# Patient Record
Sex: FEMALE | Race: WHITE | HISPANIC OR LATINO | Employment: OTHER | URBAN - METROPOLITAN AREA
[De-identification: names, ages, dates, MRNs, and addresses within clinical notes are randomized per-mention and may not be internally consistent; named-entity substitution may affect disease eponyms.]

---

## 2017-04-11 ENCOUNTER — ANESTHESIA EVENT (OUTPATIENT)
Dept: SURGERY | Facility: OTHER | Age: 52
DRG: 470 | End: 2017-04-11
Payer: MEDICARE

## 2017-04-11 ENCOUNTER — HOSPITAL ENCOUNTER (OUTPATIENT)
Dept: PREADMISSION TESTING | Facility: OTHER | Age: 52
Discharge: HOME | End: 2017-04-11
Attending: ORTHOPAEDIC SURGERY
Payer: MEDICARE

## 2017-04-11 VITALS
HEART RATE: 87 BPM | TEMPERATURE: 98 F | SYSTOLIC BLOOD PRESSURE: 142 MMHG | WEIGHT: 177 LBS | OXYGEN SATURATION: 97 % | BODY MASS INDEX: 29.49 KG/M2 | DIASTOLIC BLOOD PRESSURE: 93 MMHG | HEIGHT: 65 IN

## 2017-04-11 DIAGNOSIS — M17.12 PRIMARY OSTEOARTHRITIS OF LEFT KNEE: Primary | ICD-10-CM

## 2017-04-11 DIAGNOSIS — Z01.818 PREOP TESTING: ICD-10-CM

## 2017-04-11 LAB
ABO + RH BLD: NORMAL
ANION GAP SERPL CALC-SCNC: 9 MMOL/L
BASOPHILS # BLD AUTO: 0.02 K/UL
BASOPHILS NFR BLD: 0.3 %
BILIRUB UR QL STRIP: NEGATIVE
BLD GP AB SCN CELLS X3 SERPL QL: NORMAL
BUN SERPL-MCNC: 18 MG/DL
CALCIUM SERPL-MCNC: 10 MG/DL
CHLORIDE SERPL-SCNC: 105 MMOL/L
CLARITY UR: CLEAR
CO2 SERPL-SCNC: 29 MMOL/L
COLOR UR: YELLOW
CREAT SERPL-MCNC: 0.9 MG/DL
DIFFERENTIAL METHOD: ABNORMAL
EOSINOPHIL # BLD AUTO: 0.2 K/UL
EOSINOPHIL NFR BLD: 2.3 %
ERYTHROCYTE [DISTWIDTH] IN BLOOD BY AUTOMATED COUNT: 13.2 %
EST. GFR  (AFRICAN AMERICAN): >60 ML/MIN/1.73 M^2
EST. GFR  (NON AFRICAN AMERICAN): >60 ML/MIN/1.73 M^2
GLUCOSE SERPL-MCNC: 102 MG/DL
GLUCOSE UR QL STRIP: NEGATIVE
HCT VFR BLD AUTO: 44 %
HGB BLD-MCNC: 13.9 G/DL
HGB UR QL STRIP: NEGATIVE
KETONES UR QL STRIP: NEGATIVE
LEUKOCYTE ESTERASE UR QL STRIP: NEGATIVE
LYMPHOCYTES # BLD AUTO: 2.3 K/UL
LYMPHOCYTES NFR BLD: 34.7 %
MCH RBC QN AUTO: 27.6 PG
MCHC RBC AUTO-ENTMCNC: 31.6 %
MCV RBC AUTO: 87 FL
MONOCYTES # BLD AUTO: 0.5 K/UL
MONOCYTES NFR BLD: 7.1 %
NEUTROPHILS # BLD AUTO: 3.7 K/UL
NEUTROPHILS NFR BLD: 55.6 %
NITRITE UR QL STRIP: NEGATIVE
PH UR STRIP: 6 [PH] (ref 5–8)
PLATELET # BLD AUTO: 309 K/UL
PMV BLD AUTO: 9.9 FL
POTASSIUM SERPL-SCNC: 3.9 MMOL/L
PROT UR QL STRIP: NEGATIVE
RBC # BLD AUTO: 5.04 M/UL
SODIUM SERPL-SCNC: 143 MMOL/L
SP GR UR STRIP: <=1.005 (ref 1–1.03)
URN SPEC COLLECT METH UR: ABNORMAL
UROBILINOGEN UR STRIP-ACNC: NEGATIVE EU/DL
WBC # BLD AUTO: 6.63 K/UL

## 2017-04-11 PROCEDURE — 86900 BLOOD TYPING SEROLOGIC ABO: CPT

## 2017-04-11 PROCEDURE — 81003 URINALYSIS AUTO W/O SCOPE: CPT

## 2017-04-11 PROCEDURE — 85025 COMPLETE CBC W/AUTO DIFF WBC: CPT

## 2017-04-11 PROCEDURE — 87086 URINE CULTURE/COLONY COUNT: CPT

## 2017-04-11 PROCEDURE — 80048 BASIC METABOLIC PNL TOTAL CA: CPT

## 2017-04-11 PROCEDURE — 93005 ELECTROCARDIOGRAM TRACING: CPT

## 2017-04-11 PROCEDURE — 93010 ELECTROCARDIOGRAM REPORT: CPT | Mod: ,,, | Performed by: INTERNAL MEDICINE

## 2017-04-11 PROCEDURE — 86901 BLOOD TYPING SEROLOGIC RH(D): CPT

## 2017-04-11 PROCEDURE — 36415 COLL VENOUS BLD VENIPUNCTURE: CPT

## 2017-04-11 RX ORDER — FAMOTIDINE 20 MG/1
20 TABLET, FILM COATED ORAL
Status: CANCELLED | OUTPATIENT
Start: 2017-04-11 | End: 2017-04-11

## 2017-04-11 RX ORDER — FLUTICASONE PROPIONATE 50 MCG
1 SPRAY, SUSPENSION (ML) NASAL DAILY
COMMUNITY

## 2017-04-11 RX ORDER — MEDROXYPROGESTERONE ACETATE 2.5 MG/1
1.25 TABLET ORAL DAILY
COMMUNITY

## 2017-04-11 RX ORDER — ATORVASTATIN CALCIUM 20 MG/1
20 TABLET, FILM COATED ORAL DAILY
COMMUNITY

## 2017-04-11 RX ORDER — PRENATAL VIT 91/IRON/FOLIC/DHA 28-975-200
COMBINATION PACKAGE (EA) ORAL 2 TIMES DAILY
COMMUNITY

## 2017-04-11 RX ORDER — SODIUM CHLORIDE, SODIUM LACTATE, POTASSIUM CHLORIDE, CALCIUM CHLORIDE 600; 310; 30; 20 MG/100ML; MG/100ML; MG/100ML; MG/100ML
INJECTION, SOLUTION INTRAVENOUS CONTINUOUS
Status: CANCELLED | OUTPATIENT
Start: 2017-04-11

## 2017-04-11 RX ORDER — PNV NO.95/FERROUS FUM/FOLIC AC 28MG-0.8MG
1 TABLET ORAL DAILY
Status: ON HOLD | COMMUNITY
End: 2017-04-25 | Stop reason: HOSPADM

## 2017-04-11 RX ORDER — MUPIROCIN 20 MG/G
OINTMENT TOPICAL 2 TIMES DAILY
Status: ON HOLD | COMMUNITY
End: 2017-04-25 | Stop reason: HOSPADM

## 2017-04-11 RX ORDER — AMOXICILLIN 500 MG
2 CAPSULE ORAL DAILY
Status: ON HOLD | COMMUNITY
End: 2017-04-25 | Stop reason: HOSPADM

## 2017-04-11 RX ORDER — VITAMIN E 268 MG
400 CAPSULE ORAL DAILY
Status: ON HOLD | COMMUNITY
End: 2017-04-25 | Stop reason: HOSPADM

## 2017-04-11 RX ORDER — FEXOFENADINE HCL 60 MG
60 TABLET ORAL DAILY
COMMUNITY

## 2017-04-11 RX ORDER — DEXTROMETHORPHAN HYDROBROMIDE, GUAIFENESIN 5; 100 MG/5ML; MG/5ML
650 LIQUID ORAL EVERY 8 HOURS
Status: ON HOLD | COMMUNITY
End: 2017-04-25 | Stop reason: HOSPADM

## 2017-04-11 RX ORDER — PANTOPRAZOLE SODIUM 20 MG/1
20 TABLET, DELAYED RELEASE ORAL DAILY
COMMUNITY

## 2017-04-11 RX ORDER — DICLOFENAC SODIUM AND MISOPROSTOL 75; 200 MG/1; UG/1
1 TABLET, DELAYED RELEASE ORAL DAILY
Status: ON HOLD | COMMUNITY
End: 2017-04-25 | Stop reason: HOSPADM

## 2017-04-11 RX ORDER — LEVOTHYROXINE SODIUM 25 UG/1
25 TABLET ORAL DAILY
COMMUNITY

## 2017-04-11 RX ORDER — HYDROXYCHLOROQUINE SULFATE 200 MG/1
200 TABLET, FILM COATED ORAL 2 TIMES DAILY
Status: ON HOLD | COMMUNITY
End: 2017-04-25 | Stop reason: HOSPADM

## 2017-04-11 RX ORDER — MIDAZOLAM HYDROCHLORIDE 5 MG/ML
5 INJECTION INTRAMUSCULAR; INTRAVENOUS
Status: ACTIVE | OUTPATIENT
Start: 2017-04-11 | End: 2017-04-12

## 2017-04-11 RX ORDER — LISINOPRIL 10 MG/1
10 TABLET ORAL DAILY
COMMUNITY

## 2017-04-11 NOTE — DISCHARGE INSTRUCTIONS
PRE-ADMIT TESTING -  [phone]    [address]  [facility]        OUTPATIENT SURGERY UNIT - [phone]    Your surgery has been scheduled at [facility]. We are pleased to have the opportunity to serve you. For Further Information please call [phone].    On the day of surgery please report to the Information Desk on the 1st floor.    CONTACT YOUR PHYSICIAN'S OFFICE THE DAY PRIOR TO YOUR SURGERY TO OBTAIN YOUR ARRIVAL TIME.     The evening before surgery do not eat anything after 9 p.m. ( this includes hard candy, chewing gum and mints).  You may have GATORADE, POWERADE AND WATER FROM 9 p.m. until leaving home to come to the hospital.   DO NOT DRINK ANY LIQUIDS ON THE WAY TO THE HOSPITAL.     SPECIAL MEDICATION INSTRUCTIONS: TAKE medications checked off by the Anesthesiologist on your Medication List.    Angiogram Patients: Take medications as instructed by your physician, including aspirin.     Surgery Patients:    If you take ASPIRIN - Your PHYSICIAN/SURGEON will need to inform you IF/OR when you need to stop taking aspirin prior to your surgery.     Do Not take any medications containing IBUPROFEN.  Do Not Wear any make-up or dark nail polish   (especially eye make-up) to surgery. If you come to surgery with makeup on you will be required to remove the makeup or nail polish.    Do not shave your surgical area at least 5 days prior to your surgery. The surgical prep will be performed at the hospital according to Infection Control regulations.    Leave all valuables at home.   Do Not wear any jewelry or watches, including any metal in body piercings.  Contact Lens must be removed before surgery. Either do not wear the contact lens or bring a case and solution for storage.  Please bring a container for eyeglasses or dentures as required.  Bring any paperwork your physician has provided, such as consent forms,  history and physicals, doctor's orders, etc.   Bring comfortable  clothes that are loose fitting to wear upon discharge. Take into consideration the type of surgery being performed.  Maintain your diet as advised per your physician the day prior to surgery.      Adequate rest the night before surgery is advised.   Park in the Parking lot behind the hospital or in the [facility] across the street from the parking lot. Parking is complimentary.  If you will be discharged the same day as your procedure, please arrange for a responsible adult to drive you home or to accompany you if traveling by taxi.   YOU WILL NOT BE PERMITTED TO DRIVE OR TO LEAVE THE HOSPITAL ALONE AFTER SURGERY.   It is strongly recommended that you arrange for someone to remain with you for the first 24 hrs following your surgery.       Thank you for your cooperation.  The Staff of [facility].        Bathing Instructions                                                                 Please shower the evening before and morning of your procedure with    ANTIBACTERIAL SOAP. ( DIAL, etc )  Concentrate on the surgical area   for at least 3 minutes and rinse completely. Dry off as usual.   Do not use any deodorant, powder, body lotions, perfume, after shave or    cologne.

## 2017-04-11 NOTE — ANESTHESIA PREPROCEDURE EVALUATION
04/11/2017  Nella Lewis is a 51 y.o., female.    Anesthesia Evaluation    I have reviewed the Patient Summary Reports.    I have reviewed the Nursing Notes.   I have reviewed the Medications.     Review of Systems  Anesthesia Hx:  No problems with previous Anesthesia  Denies Family Hx of Anesthesia complications.   Denies Personal Hx of Anesthesia complications.   Social:  Non-Smoker    Hematology/Oncology:  Hematology Normal   Oncology Normal     EENT/Dental:   chronic allergic rhinitis   Cardiovascular:   Hypertension, well controlled    Pulmonary:  Pulmonary Normal    Renal/:  Renal/ Normal     Hepatic/GI:   GERD, well controlled    Musculoskeletal:   Hx of 3 bulging lumbar discs Spine Disorders: lumbar Disc disease    Neurological:  Neurology Normal    Endocrine:   Hypothyroidism        Physical Exam  General:  Well nourished    Airway/Jaw/Neck:  Airway Findings: Mouth Opening: Normal Tongue: Normal  General Airway Assessment: Adult  Mallampati: II  TM Distance: Normal, at least 6 cm         Dental:  Dental Findings: In tact             Anesthesia Plan  Type of Anesthesia, risks & benefits discussed:  Anesthesia Type:  spinal  Patient's Preference:   Intra-op Monitoring Plan: standard ASA monitors  Intra-op Monitoring Plan Comments:   Post Op Pain Control Plan:   Post Op Pain Control Plan Comments:   Induction:    Beta Blocker:         Informed Consent: Patient understands risks and agrees with Anesthesia plan.  Questions answered. Anesthesia consent signed with patient.  ASA Score: 2     Day of Surgery Review of History & Physical:    H&P update referred to the surgeon.     Anesthesia Plan Notes: Clearance and labs on chart        Ready For Surgery From Anesthesia Perspective.

## 2017-04-11 NOTE — IP AVS SNAPSHOT
[facility] ([facility])  [address] [place], [place] [address]  Phone: [phone]           Patient Discharge Instructions  Our goal is to set you up for success. This packet includes information on your condition, medications, and your home care. It will help you care for yourself to prevent having to return to the hospital.     Please ask your nurse if you have any questions.      There are many details to remember when preparing for your surgery. Here is what you will need to do, please ask your nurse if there are more specific instructions and if you have any questions:    1. Before procedure Do not smoke or drink alcoholic beverages 24 hours prior to your procedure. Do not eat or drink anything 8 hours before your procedure - this includes gum, mints, and candy.     2. Day of procedure Please remove all jewelry for the procedure. If you wear contact lenses, dentures, hearing aids or glasses, bring a container to put them in during your surgery and give to a family member.  If your doctor has scheduled you for an overnight stay, bring a small overnight bag with any personal items that you need.      3. After procedure  Make arrangements in advance for transportation home by a responsible adult. It is not safe to drive a vehicle during the 24 hours following surgery.     PLEASE NOTE: You may be contacted the day before your surgery to confirm your surgery date and arrival time. The Surgery schedule has many variables which may affect the time of your surgery case. Family members should be available if your surgery time changes.               ** Verify the list of medication(s) below is accurate and up to date. Carry this with you in case of emergency. If your medications have changed, please notify your healthcare provider.             Lista de medicamentos      TOME estos medicamentos        Additional Info                      ALIGN ORAL   Recargas:  0   Dosis:  4 mg    Instrucciones:  Take  4 mg by mouth once daily.     Begin Date    AM    Noon    PM    Bedtime       atorvastatin 20 MG tablet   También conocido dinh:  LIPITOR   Recargas:  0   Dosis:  20 mg    Instrucciones:  Take 20 mg by mouth once daily.     Begin Date    AM    Noon    PM    Bedtime       CALCIUM 500 + D 500 mg(1,250mg) -400 unit Tab   Recargas:  0   Dosis:  1 tablet   Medicamento genérico:  calcium carbonate-vitamin D3    Instrucciones:  Take 1 tablet by mouth once daily.     Begin Date    AM    Noon    PM    Bedtime       CENTRUM SILVER ULTRA WOMEN'S ORAL   Recargas:  0   Dosis:  1 tablet    Instrucciones:  Take 1 tablet by mouth once daily.     Begin Date    AM    Noon    PM    Bedtime       diclofenac-misoprostol  mg-mcg  mg-mcg per tablet   También conocido dinh:  ARTHROTEC 75   Recargas:  0   Dosis:  1 tablet    Instrucciones:  Take 1 tablet by mouth once daily.     Begin Date    AM    Noon    PM    Bedtime       fexofenadine 60 MG tablet   También conocido dinh:  ALLEGRA   Recargas:  0   Dosis:  60 mg    Instrucciones:  Take 60 mg by mouth once daily.     Begin Date    AM    Noon    PM    Bedtime       fish oil-omega-3 fatty acids 300-1,000 mg capsule   Recargas:  0   Dosis:  2 g    Instrucciones:  Take 2 g by mouth once daily.     Begin Date    AM    Noon    PM    Bedtime       fluticasone 50 mcg/actuation nasal spray   También conocido dinh:  FLONASE   Recargas:  0   Dosis:  1 spray    Instrucciones:  1 spray by Each Nare route once daily.     Begin Date    AM    Noon    PM    Bedtime       hydroxychloroquine 200 mg tablet   También conocido dinh:  PLAQUENIL   Recargas:  0   Dosis:  200 mg    Instrucciones:  Take 200 mg by mouth 2 (two) times daily.     Begin Date    AM    Noon    PM    Bedtime       lisinopril 10 MG tablet   Recargas:  0   Dosis:  10 mg    Instrucciones:  Take 10 mg by mouth once daily.     Begin Date    AM    Noon    PM    Bedtime       medroxyPROGESTERone 2.5 MG tablet   También conocido dinh:   PROVERA   Recargas:  0   Dosis:  1.25 mg    Instrucciones:  Take 1.25 mg by mouth once daily.     Begin Date    AM    Noon    PM    Bedtime       mupirocin 2 % ointment   También conocido dinh:  BACTROBAN   Recargas:  0    Instrucciones:  Apply topically 2 (two) times daily.     Begin Date    AM    Noon    PM    Bedtime       PROTONIX 20 MG tablet   Recargas:  0   Dosis:  20 mg   Medicamento genérico:  pantoprazole    Instrucciones:  Take 20 mg by mouth once daily.     Begin Date    AM    Noon    PM    Bedtime       SYNTHROID 25 MCG tablet   Recargas:  0   Dosis:  25 mcg   Medicamento genérico:  levothyroxine    Instrucciones:  Take 25 mcg by mouth once daily.     Begin Date    AM    Noon    PM    Bedtime       terbinafine HCl 1 % cream   También conocido dinh:  LAMISIL   Recargas:  0    Instrucciones:  Apply topically 2 (two) times daily.     Begin Date    AM    Noon    PM    Bedtime       TYLENOL 8 HOUR 650 MG Tbsr   Recargas:  0   Dosis:  650 mg   Medicamento genérico:  acetaminophen    Instrucciones:  Take 650 mg by mouth every 8 (eight) hours.     Begin Date    AM    Noon    PM    Bedtime       vitamin E 400 UNIT capsule   Recargas:  0   Dosis:  400 Units    Instrucciones:  Take 400 Units by mouth once daily.     Begin Date    AM    Noon    PM    Bedtime                  Please bring to all follow up appointments:    1. A copy of your discharge instructions.  2. All medicines you are currently taking in their original bottles.  3. Identification and insurance card.    Please arrive 15 minutes ahead of scheduled appointment time.    Please call 24 hours in advance if you must reschedule your appointment and/or time.        Karina cirugías futuras / Procedimientos     Apr 24, 2017   Surgery with Harish Julian MD   [facility] ([facility])    [address]  [place] [address]   [phone]                  Instrucciones a gerard de randee       PRE-ADMIT TESTING -  [phone]    [address] [facility]        OUTPATIENT SURGERY UNIT - [phone]    Your surgery has been scheduled at [facility]. We are pleased to have the opportunity to serve you. For Further Information please call [phone].    On the day of surgery please report to the Information Desk on the 1st floor.    CONTACT YOUR PHYSICIAN'S OFFICE THE DAY PRIOR TO YOUR SURGERY TO OBTAIN YOUR ARRIVAL TIME.     The evening before surgery do not eat anything after 9 p.m. ( this includes hard candy, chewing gum and mints).  You may have GATORADE, POWERADE AND WATER FROM 9 p.m. until leaving home to come to the hospital.   DO NOT DRINK ANY LIQUIDS ON THE WAY TO THE HOSPITAL.     SPECIAL MEDICATION INSTRUCTIONS: TAKE medications checked off by the Anesthesiologist on your Medication List.    Angiogram Patients: Take medications as instructed by your physician, including aspirin.     Surgery Patients:    If you take ASPIRIN - Your PHYSICIAN/SURGEON will need to inform you IF/OR when you need to stop taking aspirin prior to your surgery.     Do Not take any medications containing IBUPROFEN.  Do Not Wear any make-up or dark nail polish   (especially eye make-up) to surgery. If you come to surgery with makeup on you will be required to remove the makeup or nail polish.    Do not shave your surgical area at least 5 days prior to your surgery. The surgical prep will be performed at the hospital according to Infection Control regulations.    Leave all valuables at home.   Do Not wear any jewelry or watches, including any metal in body piercings.  Contact Lens must be removed before surgery. Either do not wear the contact lens or bring a case and solution for storage.  Please bring a container for eyeglasses or dentures as required.  Bring any paperwork your physician has provided, such as consent forms,  history and physicals, doctor's orders, etc.   Bring comfortable clothes that are loose  "fitting to wear upon discharge. Take into consideration the type of surgery being performed.  Maintain your diet as advised per your physician the day prior to surgery.      Adequate rest the night before surgery is advised.   Park in the Parking lot behind the hospital or in the [facility] across the street from the parking lot. Parking is complimentary.  If you will be discharged the same day as your procedure, please arrange for a responsible adult to drive you home or to accompany you if traveling by taxi.   YOU WILL NOT BE PERMITTED TO DRIVE OR TO LEAVE THE HOSPITAL ALONE AFTER SURGERY.   It is strongly recommended that you arrange for someone to remain with you for the first 24 hrs following your surgery.       Thank you for your cooperation.  The Staff of [facility].        Bathing Instructions                                                                 Please shower the evening before and morning of your procedure with    ANTIBACTERIAL SOAP. ( DIAL, etc )  Concentrate on the surgical area   for at least 3 minutes and rinse completely. Dry off as usual.   Do not use any deodorant, powder, body lotions, perfume, after shave or    cologne.                                                Información de Admisión     Fecha y hora Proveedor Departamento CSN    4/11/2017  9:30 AM Harish Julian MD [facility] [id number]      Los proveedores de cuidado     Personal Médico Rol [facility] Teléfono principal de la oficina    Harish Julian MD Attending Provider Orthopedic Surgery [phone]      Karina signos vitales julius     PS Pulso Temperatura [place] SpO2    142/93 87 98.4 °F (36.9 °C) (Oral) 5' 5" (1.651 m) 80.3 kg (177 lb) 97%    BMI (IMC)                   29.45 kg/m2           Recent Lab Values     No lab values to display.      Alergias     A partir del:  4/11/2017           Reacciones    Avelox [Moxifloxacin] Anaphylaxis         Demerol [Meperidine] " Anaphylaxis    Ativan [Lorazepam] Other (See Comments)    Agitation      [facility]     [facility] Nurse Care Line - 24/7 Assistance  Unless otherwise directed by your provider, please contact [facility], our nurse care line that is available for 24/7 assistance.     Registered nurses in the [facility] provide clinical advisement, health education, appointment booking, and other advisory services.  Call for this free service at [phone].        Directiva Anticipada     An advance directive is a document which, in the event you are no longer able to make decisions for yourself, tells your healthcare team what kind of treatment you do or do not want to receive, or who you would like to make those decisions for you.  If you do not currently have an advance directive, [facility] encourages you to create one.  For more information call:  [phone]), [phone] ([phone]),  or log on to [email or web address].        Language Assistance Services     ATTENTION: Language assistance services are available, free of charge. Please call [phone].      ATENCIÓN: Si habla español, tiene a wang disposición servicios gratuitos de asistencia lingüística. Llame al [phone].     CHÚ Ý: N?u b?n nói Ti?ng Vi?t, có các d?ch v? h? tr? ngôn ng? mi?n phí dành cho b?n. G?i s? [phone].        Registrarse para [facility]     Activating your [facility] account is as easy as 1-2-3!     1) Visit [email or web address], select Sign Up Now, enter this activation code and your date of birth, then select Next.  [email or web address]  Expires: 5/26/2017 10:32 AM      2) Create a username and password to use when you visit [facility] in the future and select a security question in case you lose your password and select Next.    3) Enter your e-mail address and click Sign Up!    Additional Information  If you have questions, please e-mail [email or web address] or call [phone] to talk to our  [facility] staff. Remember, [facility] is NOT to be used for urgent needs. For medical emergencies, dial 911.          [facility] complies with applicable Federal civil rights laws and does not discriminate on the basis of race, color, national origin, age, disability, or sex.                      [facility] Location ([facility])  [address] [place] [address]  Phone: [phone]           Patient Discharge Instructions  Our goal is to set you up for success. This packet includes information on your condition, medications, and your home care. It will help you care for yourself to prevent having to return to the hospital.     Please ask your nurse if you have any questions.      There are many details to remember when preparing for your surgery. Here is what you will need to do, please ask your nurse if there are more specific instructions and if you have any questions:    1. Before procedure Do not smoke or drink alcoholic beverages 24 hours prior to your procedure. Do not eat or drink anything 8 hours before your procedure - this includes gum, mints, and candy.     2. Day of procedure Please remove all jewelry for the procedure. If you wear contact lenses, dentures, hearing aids or glasses, bring a container to put them in during your surgery and give to a family member.  If your doctor has scheduled you for an overnight stay, bring a small overnight bag with any personal items that you need.      3. After procedure  Make arrangements in advance for transportation home by a responsible adult. It is not safe to drive a vehicle during the 24 hours following surgery.     PLEASE NOTE: You may be contacted the day before your surgery to confirm your surgery date and arrival time. The Surgery schedule has many variables which may affect the time of your surgery case. Family members should be available if your surgery time changes.               ** Verify the list of medication(s) below is accurate  and up to date. Carry this with you in case of emergency. If your medications have changed, please notify your healthcare provider.             Medication List      TAKE these medications        Additional Info                      ALIGN ORAL   Refills:  0   Dose:  4 mg    Instructions:  Take 4 mg by mouth once daily.     Begin Date    AM    Noon    PM    Bedtime       atorvastatin 20 MG tablet   Commonly known as:  LIPITOR   Refills:  0   Dose:  20 mg    Instructions:  Take 20 mg by mouth once daily.     Begin Date    AM    Noon    PM    Bedtime       CALCIUM 500 + D 500 mg(1,250mg) -400 unit Tab   Refills:  0   Dose:  1 tablet   Generic drug:  calcium carbonate-vitamin D3    Instructions:  Take 1 tablet by mouth once daily.     Begin Date    AM    Noon    PM    Bedtime       CENTRUM SILVER ULTRA WOMEN'S ORAL   Refills:  0   Dose:  1 tablet    Instructions:  Take 1 tablet by mouth once daily.     Begin Date    AM    Noon    PM    Bedtime       diclofenac-misoprostol  mg-mcg  mg-mcg per tablet   Commonly known as:  ARTHROTEC 75   Refills:  0   Dose:  1 tablet    Instructions:  Take 1 tablet by mouth once daily.     Begin Date    AM    Noon    PM    Bedtime       fexofenadine 60 MG tablet   Commonly known as:  ALLEGRA   Refills:  0   Dose:  60 mg    Instructions:  Take 60 mg by mouth once daily.     Begin Date    AM    Noon    PM    Bedtime       fish oil-omega-3 fatty acids 300-1,000 mg capsule   Refills:  0   Dose:  2 g    Instructions:  Take 2 g by mouth once daily.     Begin Date    AM    Noon    PM    Bedtime       fluticasone 50 mcg/actuation nasal spray   Commonly known as:  FLONASE   Refills:  0   Dose:  1 spray    Instructions:  1 spray by Each Nare route once daily.     Begin Date    AM    Noon    PM    Bedtime       hydroxychloroquine 200 mg tablet   Commonly known as:  PLAQUENIL   Refills:  0   Dose:  200 mg    Instructions:  Take 200 mg by mouth 2 (two) times daily.     Begin Date    AM     Noon    PM    Bedtime       lisinopril 10 MG tablet   Refills:  0   Dose:  10 mg    Instructions:  Take 10 mg by mouth once daily.     Begin Date    AM    Noon    PM    Bedtime       medroxyPROGESTERone 2.5 MG tablet   Commonly known as:  PROVERA   Refills:  0   Dose:  1.25 mg    Instructions:  Take 1.25 mg by mouth once daily.     Begin Date    AM    Noon    PM    Bedtime       mupirocin 2 % ointment   Commonly known as:  BACTROBAN   Refills:  0    Instructions:  Apply topically 2 (two) times daily.     Begin Date    AM    Noon    PM    Bedtime       PROTONIX 20 MG tablet   Refills:  0   Dose:  20 mg   Generic drug:  pantoprazole    Instructions:  Take 20 mg by mouth once daily.     Begin Date    AM    Noon    PM    Bedtime       SYNTHROID 25 MCG tablet   Refills:  0   Dose:  25 mcg   Generic drug:  levothyroxine    Instructions:  Take 25 mcg by mouth once daily.     Begin Date    AM    Noon    PM    Bedtime       terbinafine HCl 1 % cream   Commonly known as:  LAMISIL   Refills:  0    Instructions:  Apply topically 2 (two) times daily.     Begin Date    AM    Noon    PM    Bedtime       TYLENOL 8 HOUR 650 MG Tbsr   Refills:  0   Dose:  650 mg   Generic drug:  acetaminophen    Instructions:  Take 650 mg by mouth every 8 (eight) hours.     Begin Date    AM    Noon    PM    Bedtime       vitamin E 400 UNIT capsule   Refills:  0   Dose:  400 Units    Instructions:  Take 400 Units by mouth once daily.     Begin Date    AM    Noon    PM    Bedtime                  Please bring to all follow up appointments:    1. A copy of your discharge instructions.  2. All medicines you are currently taking in their original bottles.  3. Identification and insurance card.    Please arrive 15 minutes ahead of scheduled appointment time.    Please call 24 hours in advance if you must reschedule your appointment and/or time.        Your Future Surgeries/Procedures     Apr 24, 2017   Surgery with Harish Julian MD   [facility] ([facility])    [address]  [facility] [address]   [phone]                  Discharge Instructions       PRE-ADMIT TESTING -  [phone]    [address]  [facility]        OUTPATIENT SURGERY UNIT - [phone]    Your surgery has been scheduled at [facility]. We are pleased to have the opportunity to serve you. For Further Information please call [phone].    On the day of surgery please report to the Information Desk on the 1st floor.    CONTACT YOUR PHYSICIAN'S OFFICE THE DAY PRIOR TO YOUR SURGERY TO OBTAIN YOUR ARRIVAL TIME.     The evening before surgery do not eat anything after 9 p.m. ( this includes hard candy, chewing gum and mints).  You may have GATORADE, POWERADE AND WATER FROM 9 p.m. until leaving home to come to the hospital.   DO NOT DRINK ANY LIQUIDS ON THE WAY TO THE HOSPITAL.     SPECIAL MEDICATION INSTRUCTIONS: TAKE medications checked off by the Anesthesiologist on your Medication List.    Angiogram Patients: Take medications as instructed by your physician, including aspirin.     Surgery Patients:    If you take ASPIRIN - Your PHYSICIAN/SURGEON will need to inform you IF/OR when you need to stop taking aspirin prior to your surgery.     Do Not take any medications containing IBUPROFEN.  Do Not Wear any make-up or dark nail polish   (especially eye make-up) to surgery. If you come to surgery with makeup on you will be required to remove the makeup or nail polish.    Do not shave your surgical area at least 5 days prior to your surgery. The surgical prep will be performed at the hospital according to Infection Control regulations.    Leave all valuables at home.   Do Not wear any jewelry or watches, including any metal in body piercings.  Contact Lens must be removed before surgery. Either do not wear the contact lens or bring a case and solution for storage.  Please bring a container for eyeglasses or dentures as  "required.  Bring any paperwork your physician has provided, such as consent forms,  history and physicals, doctor's orders, etc.   Bring comfortable clothes that are loose fitting to wear upon discharge. Take into consideration the type of surgery being performed.  Maintain your diet as advised per your physician the day prior to surgery.      Adequate rest the night before surgery is advised.   Park in the Parking lot behind the hospital or in the [place] [facility] across the street from the parking lot. Parking is complimentary.  If you will be discharged the same day as your procedure, please arrange for a responsible adult to drive you home or to accompany you if traveling by taxi.   YOU WILL NOT BE PERMITTED TO DRIVE OR TO LEAVE THE HOSPITAL ALONE AFTER SURGERY.   It is strongly recommended that you arrange for someone to remain with you for the first 24 hrs following your surgery.       Thank you for your cooperation.  The Staff of [facility].        Bathing Instructions                                                                 Please shower the evening before and morning of your procedure with    ANTIBACTERIAL SOAP. ( DIAL, etc )  Concentrate on the surgical area   for at least 3 minutes and rinse completely. Dry off as usual.   Do not use any deodorant, powder, body lotions, perfume, after shave or    cologne.                                                Admission Information     Date & Time Provider Department CSN    4/11/2017  9:30 AM Harish Julian MD [facility] [id number]      Care Providers     Provider Role Specialty Primary office phone    Harish Julian MD Attending Provider Orthopedic Surgery [phone]      Your Vitals Were     BP Pulse Temp Height Weight SpO2    142/93 87 98.4 °F (36.9 °C) (Oral) 5' 5" (1.651 m) 80.3 kg (177 lb) 97%    BMI                   29.45 kg/m2           Recent Lab Values     No lab values to display.      Allergies " as of 4/11/2017        Reactions    Avelox [Moxifloxacin] Anaphylaxis         Demerol [Meperidine] Anaphylaxis    Ativan [Lorazepam] Other (See Comments)    Agitation      [facility]     [facility] Nurse Care Line - 24/7 Assistance  Unless otherwise directed by your provider, please contact [facility], our nurse care line that is available for 24/7 assistance.     Registered nurses in the [facility] provide clinical advisement, health education, appointment booking, and other advisory services.  Call for this free service at [phone].        Advance Directives     An advance directive is a document which, in the event you are no longer able to make decisions for yourself, tells your healthcare team what kind of treatment you do or do not want to receive, or who you would like to make those decisions for you.  If you do not currently have an advance directive, [facility] encourages you to create one.  For more information call:  [phone]), [phone] ([phone]),  or log on to [email or web address].        Language Assistance Services     ATTENTION: Language assistance services are available, free of charge. Please call [phone].      ATENCIÓN: Si gagela isael, tiene a wang disposición servicios gratuitos de asistencia lingüística. Llame al [phone].     CHÚ Ý: N?u b?n nói Ti?ng Vi?t, có các d?ch v? h? tr? ngôn ng? mi?n phí dành cho b?n. G?i s? [phone].        [facility] Sign-Up     Activating your [facility] account is as easy as 1-2-3!     1) Visit [email or web address], select Sign Up Now, enter this activation code and your date of birth, then select Next.  [email or web address]  Expires: 5/26/2017 10:32 AM      2) Create a username and password to use when you visit [facility] in the future and select a security question in case you lose your password and select Next.    3) Enter your e-mail address and click Sign Up!    Additional Information  If you  have questions, please e-mail [email or web address] or call [phone] to talk to our [facility] staff. Remember, [facility] is NOT to be used for urgent needs. For medical emergencies, dial 911.          [facility] complies with applicable Federal civil rights laws and does not discriminate on the basis of race, color, national origin, age, disability, or sex.

## 2017-04-12 LAB — BACTERIA UR CULT: NO GROWTH

## 2017-04-24 ENCOUNTER — HOSPITAL ENCOUNTER (INPATIENT)
Facility: OTHER | Age: 52
LOS: 1 days | Discharge: HOME HEALTH | DRG: 470 | End: 2017-04-25
Attending: ORTHOPAEDIC SURGERY | Admitting: ORTHOPAEDIC SURGERY
Payer: MEDICARE

## 2017-04-24 ENCOUNTER — ANESTHESIA (OUTPATIENT)
Dept: SURGERY | Facility: OTHER | Age: 52
DRG: 470 | End: 2017-04-24
Payer: MEDICARE

## 2017-04-24 DIAGNOSIS — M17.12 PRIMARY OSTEOARTHRITIS OF LEFT KNEE: Primary | ICD-10-CM

## 2017-04-24 PROCEDURE — 94799 UNLISTED PULMONARY SVC/PX: CPT

## 2017-04-24 PROCEDURE — 8E0YXBZ COMPUTER ASSISTED PROCEDURE OF LOWER EXTREMITY: ICD-10-PCS | Performed by: ORTHOPAEDIC SURGERY

## 2017-04-24 PROCEDURE — 97161 PT EVAL LOW COMPLEX 20 MIN: CPT

## 2017-04-24 PROCEDURE — 25000003 PHARM REV CODE 250: Performed by: ANESTHESIOLOGY

## 2017-04-24 PROCEDURE — 0SRD0J9 REPLACEMENT OF LEFT KNEE JOINT WITH SYNTHETIC SUBSTITUTE, CEMENTED, OPEN APPROACH: ICD-10-PCS | Performed by: ORTHOPAEDIC SURGERY

## 2017-04-24 PROCEDURE — 71000039 HC RECOVERY, EACH ADD'L HOUR: Performed by: ORTHOPAEDIC SURGERY

## 2017-04-24 PROCEDURE — C1776 JOINT DEVICE (IMPLANTABLE): HCPCS | Performed by: ORTHOPAEDIC SURGERY

## 2017-04-24 PROCEDURE — 25000003 PHARM REV CODE 250: Performed by: ORTHOPAEDIC SURGERY

## 2017-04-24 PROCEDURE — 37000009 HC ANESTHESIA EA ADD 15 MINS: Performed by: ORTHOPAEDIC SURGERY

## 2017-04-24 PROCEDURE — 36000711: Performed by: ORTHOPAEDIC SURGERY

## 2017-04-24 PROCEDURE — 63600175 PHARM REV CODE 636 W HCPCS: Performed by: ORTHOPAEDIC SURGERY

## 2017-04-24 PROCEDURE — 63600175 PHARM REV CODE 636 W HCPCS: Performed by: ANESTHESIOLOGY

## 2017-04-24 PROCEDURE — 36000710: Performed by: ORTHOPAEDIC SURGERY

## 2017-04-24 PROCEDURE — 97116 GAIT TRAINING THERAPY: CPT

## 2017-04-24 PROCEDURE — 37000008 HC ANESTHESIA 1ST 15 MINUTES: Performed by: ORTHOPAEDIC SURGERY

## 2017-04-24 PROCEDURE — 71000033 HC RECOVERY, INTIAL HOUR: Performed by: ORTHOPAEDIC SURGERY

## 2017-04-24 PROCEDURE — 25000003 PHARM REV CODE 250

## 2017-04-24 PROCEDURE — C1713 ANCHOR/SCREW BN/BN,TIS/BN: HCPCS | Performed by: ORTHOPAEDIC SURGERY

## 2017-04-24 PROCEDURE — 63600175 PHARM REV CODE 636 W HCPCS

## 2017-04-24 PROCEDURE — 27201423 OPTIME MED/SURG SUP & DEVICES STERILE SUPPLY: Performed by: ORTHOPAEDIC SURGERY

## 2017-04-24 PROCEDURE — 11000001 HC ACUTE MED/SURG PRIVATE ROOM

## 2017-04-24 PROCEDURE — 63600175 PHARM REV CODE 636 W HCPCS: Performed by: NURSE ANESTHETIST, CERTIFIED REGISTERED

## 2017-04-24 PROCEDURE — 25000003 PHARM REV CODE 250: Performed by: NURSE ANESTHETIST, CERTIFIED REGISTERED

## 2017-04-24 PROCEDURE — 97110 THERAPEUTIC EXERCISES: CPT

## 2017-04-24 DEVICE — COMP FEM POST STAB CMT SZ6 L: Type: IMPLANTABLE DEVICE | Site: KNEE | Status: FUNCTIONAL

## 2017-04-24 DEVICE — PSN TIB STM 5 DEG SZ D L: Type: IMPLANTABLE DEVICE | Site: KNEE | Status: FUNCTIONAL

## 2017-04-24 DEVICE — IMPLANTABLE DEVICE: Type: IMPLANTABLE DEVICE | Site: KNEE | Status: FUNCTIONAL

## 2017-04-24 DEVICE — CEMENT BONE RDPQ 40G PDR 20ML: Type: IMPLANTABLE DEVICE | Site: KNEE | Status: FUNCTIONAL

## 2017-04-24 RX ORDER — DOCUSATE SODIUM 100 MG/1
100 CAPSULE, LIQUID FILLED ORAL EVERY 12 HOURS
Status: DISCONTINUED | OUTPATIENT
Start: 2017-04-24 | End: 2017-04-25 | Stop reason: HOSPADM

## 2017-04-24 RX ORDER — FAMOTIDINE 20 MG/1
20 TABLET, FILM COATED ORAL
Status: COMPLETED | OUTPATIENT
Start: 2017-04-24 | End: 2017-04-24

## 2017-04-24 RX ORDER — SODIUM CHLORIDE 0.9 % (FLUSH) 0.9 %
3 SYRINGE (ML) INJECTION
Status: DISCONTINUED | OUTPATIENT
Start: 2017-04-24 | End: 2017-04-25 | Stop reason: HOSPADM

## 2017-04-24 RX ORDER — MUPIROCIN 20 MG/G
1 OINTMENT TOPICAL 2 TIMES DAILY
Status: DISCONTINUED | OUTPATIENT
Start: 2017-04-24 | End: 2017-04-25 | Stop reason: HOSPADM

## 2017-04-24 RX ORDER — LEVOTHYROXINE SODIUM 25 UG/1
25 TABLET ORAL DAILY
Status: DISCONTINUED | OUTPATIENT
Start: 2017-04-25 | End: 2017-04-25 | Stop reason: HOSPADM

## 2017-04-24 RX ORDER — FLUTICASONE PROPIONATE 50 MCG
1 SPRAY, SUSPENSION (ML) NASAL DAILY
Status: DISCONTINUED | OUTPATIENT
Start: 2017-04-25 | End: 2017-04-25 | Stop reason: HOSPADM

## 2017-04-24 RX ORDER — POLYETHYLENE GLYCOL 3350 17 G/17G
17 POWDER, FOR SOLUTION ORAL DAILY
Status: DISCONTINUED | OUTPATIENT
Start: 2017-04-25 | End: 2017-04-25 | Stop reason: HOSPADM

## 2017-04-24 RX ORDER — BUPIVACAINE HCL/EPINEPHRINE 0.25-.0005
VIAL (ML) INJECTION
Status: DISCONTINUED | OUTPATIENT
Start: 2017-04-24 | End: 2017-04-24 | Stop reason: HOSPADM

## 2017-04-24 RX ORDER — ACETAMINOPHEN 10 MG/ML
INJECTION, SOLUTION INTRAVENOUS
Status: DISCONTINUED | OUTPATIENT
Start: 2017-04-24 | End: 2017-04-24

## 2017-04-24 RX ORDER — MIDAZOLAM HYDROCHLORIDE 1 MG/ML
INJECTION INTRAMUSCULAR; INTRAVENOUS
Status: DISCONTINUED | OUTPATIENT
Start: 2017-04-24 | End: 2017-04-24

## 2017-04-24 RX ORDER — MIDAZOLAM HYDROCHLORIDE 1 MG/ML
1 INJECTION, SOLUTION INTRAMUSCULAR; INTRAVENOUS ONCE
Status: COMPLETED | OUTPATIENT
Start: 2017-04-24 | End: 2017-04-24

## 2017-04-24 RX ORDER — OXYCODONE HYDROCHLORIDE 5 MG/1
10 TABLET ORAL EVERY 4 HOURS PRN
Status: DISCONTINUED | OUTPATIENT
Start: 2017-04-24 | End: 2017-04-25 | Stop reason: HOSPADM

## 2017-04-24 RX ORDER — PROPOFOL 10 MG/ML
VIAL (ML) INTRAVENOUS CONTINUOUS PRN
Status: DISCONTINUED | OUTPATIENT
Start: 2017-04-24 | End: 2017-04-24

## 2017-04-24 RX ORDER — MEDROXYPROGESTERONE ACETATE 2.5 MG/1
1.25 TABLET ORAL DAILY
Status: DISCONTINUED | OUTPATIENT
Start: 2017-04-25 | End: 2017-04-25

## 2017-04-24 RX ORDER — LIDOCAINE HCL/PF 100 MG/5ML
SYRINGE (ML) INTRAVENOUS
Status: DISCONTINUED | OUTPATIENT
Start: 2017-04-24 | End: 2017-04-24

## 2017-04-24 RX ORDER — FAMOTIDINE 20 MG/1
20 TABLET, FILM COATED ORAL 2 TIMES DAILY
Status: DISCONTINUED | OUTPATIENT
Start: 2017-04-24 | End: 2017-04-24

## 2017-04-24 RX ORDER — ROPIVACAINE HYDROCHLORIDE 5 MG/ML
INJECTION, SOLUTION EPIDURAL; INFILTRATION; PERINEURAL
Status: DISCONTINUED | OUTPATIENT
Start: 2017-04-24 | End: 2017-04-24

## 2017-04-24 RX ORDER — DEXTROSE MONOHYDRATE AND SODIUM CHLORIDE 5; .9 G/100ML; G/100ML
INJECTION, SOLUTION INTRAVENOUS CONTINUOUS
Status: DISCONTINUED | OUTPATIENT
Start: 2017-04-24 | End: 2017-04-25 | Stop reason: HOSPADM

## 2017-04-24 RX ORDER — CEFAZOLIN SODIUM 2 G/50ML
2 SOLUTION INTRAVENOUS
Status: COMPLETED | OUTPATIENT
Start: 2017-04-24 | End: 2017-04-25

## 2017-04-24 RX ORDER — ACETAMINOPHEN 10 MG/ML
1000 INJECTION, SOLUTION INTRAVENOUS
Status: DISCONTINUED | OUTPATIENT
Start: 2017-04-24 | End: 2017-04-24 | Stop reason: HOSPADM

## 2017-04-24 RX ORDER — DIPHENHYDRAMINE HYDROCHLORIDE 50 MG/ML
25 INJECTION INTRAMUSCULAR; INTRAVENOUS EVERY 8 HOURS PRN
Status: DISCONTINUED | OUTPATIENT
Start: 2017-04-24 | End: 2017-04-25 | Stop reason: HOSPADM

## 2017-04-24 RX ORDER — FENTANYL CITRATE 50 UG/ML
25 INJECTION, SOLUTION INTRAMUSCULAR; INTRAVENOUS EVERY 5 MIN PRN
Status: DISCONTINUED | OUTPATIENT
Start: 2017-04-24 | End: 2017-04-24 | Stop reason: HOSPADM

## 2017-04-24 RX ORDER — OXYCODONE HYDROCHLORIDE 5 MG/1
5 TABLET ORAL EVERY 4 HOURS PRN
Status: DISCONTINUED | OUTPATIENT
Start: 2017-04-24 | End: 2017-04-25 | Stop reason: HOSPADM

## 2017-04-24 RX ORDER — MEPERIDINE HYDROCHLORIDE 50 MG/ML
12.5 INJECTION INTRAMUSCULAR; INTRAVENOUS; SUBCUTANEOUS ONCE AS NEEDED
Status: DISCONTINUED | OUTPATIENT
Start: 2017-04-24 | End: 2017-04-24 | Stop reason: HOSPADM

## 2017-04-24 RX ORDER — CELECOXIB 200 MG/1
200 CAPSULE ORAL 2 TIMES DAILY
Status: DISCONTINUED | OUTPATIENT
Start: 2017-04-24 | End: 2017-04-25 | Stop reason: HOSPADM

## 2017-04-24 RX ORDER — HYDROMORPHONE HYDROCHLORIDE 1 MG/ML
0.5 INJECTION, SOLUTION INTRAMUSCULAR; INTRAVENOUS; SUBCUTANEOUS EVERY 4 HOURS PRN
Status: DISCONTINUED | OUTPATIENT
Start: 2017-04-24 | End: 2017-04-25 | Stop reason: HOSPADM

## 2017-04-24 RX ORDER — TRANEXAMIC ACID 100 MG/ML
INJECTION, SOLUTION INTRAVENOUS
Status: DISCONTINUED | OUTPATIENT
Start: 2017-04-24 | End: 2017-04-24

## 2017-04-24 RX ORDER — ONDANSETRON HYDROCHLORIDE 2 MG/ML
INJECTION, SOLUTION INTRAMUSCULAR; INTRAVENOUS
Status: DISCONTINUED | OUTPATIENT
Start: 2017-04-24 | End: 2017-04-24

## 2017-04-24 RX ORDER — ASPIRIN 325 MG
325 TABLET ORAL EVERY 12 HOURS
Status: DISCONTINUED | OUTPATIENT
Start: 2017-04-25 | End: 2017-04-25 | Stop reason: HOSPADM

## 2017-04-24 RX ORDER — PANTOPRAZOLE SODIUM 40 MG/1
40 TABLET, DELAYED RELEASE ORAL
Status: ACTIVE | OUTPATIENT
Start: 2017-04-24 | End: 2017-04-25

## 2017-04-24 RX ORDER — KETOROLAC TROMETHAMINE 30 MG/ML
INJECTION, SOLUTION INTRAMUSCULAR; INTRAVENOUS
Status: DISCONTINUED | OUTPATIENT
Start: 2017-04-24 | End: 2017-04-24 | Stop reason: HOSPADM

## 2017-04-24 RX ORDER — CEFAZOLIN SODIUM 2 G/50ML
2 SOLUTION INTRAVENOUS
Status: COMPLETED | OUTPATIENT
Start: 2017-04-24 | End: 2017-04-24

## 2017-04-24 RX ORDER — HYDROMORPHONE HYDROCHLORIDE 2 MG/ML
0.4 INJECTION, SOLUTION INTRAMUSCULAR; INTRAVENOUS; SUBCUTANEOUS EVERY 5 MIN PRN
Status: DISCONTINUED | OUTPATIENT
Start: 2017-04-24 | End: 2017-04-24 | Stop reason: HOSPADM

## 2017-04-24 RX ORDER — ONDANSETRON 2 MG/ML
4 INJECTION INTRAMUSCULAR; INTRAVENOUS ONCE AS NEEDED
Status: DISCONTINUED | OUTPATIENT
Start: 2017-04-24 | End: 2017-04-24 | Stop reason: HOSPADM

## 2017-04-24 RX ORDER — SODIUM CHLORIDE 9 MG/ML
INJECTION, SOLUTION INTRAVENOUS CONTINUOUS
Status: DISCONTINUED | OUTPATIENT
Start: 2017-04-24 | End: 2017-04-24

## 2017-04-24 RX ORDER — DIPHENHYDRAMINE HYDROCHLORIDE 50 MG/ML
12.5 INJECTION INTRAMUSCULAR; INTRAVENOUS EVERY 30 MIN PRN
Status: DISCONTINUED | OUTPATIENT
Start: 2017-04-24 | End: 2017-04-24 | Stop reason: HOSPADM

## 2017-04-24 RX ORDER — OXYCODONE HYDROCHLORIDE 5 MG/1
5 TABLET ORAL
Status: DISCONTINUED | OUTPATIENT
Start: 2017-04-24 | End: 2017-04-24 | Stop reason: HOSPADM

## 2017-04-24 RX ORDER — BACITRACIN 50000 [IU]/1
INJECTION, POWDER, FOR SOLUTION INTRAMUSCULAR
Status: DISCONTINUED | OUTPATIENT
Start: 2017-04-24 | End: 2017-04-24 | Stop reason: HOSPADM

## 2017-04-24 RX ORDER — SODIUM CHLORIDE, SODIUM LACTATE, POTASSIUM CHLORIDE, CALCIUM CHLORIDE 600; 310; 30; 20 MG/100ML; MG/100ML; MG/100ML; MG/100ML
INJECTION, SOLUTION INTRAVENOUS CONTINUOUS
Status: DISCONTINUED | OUTPATIENT
Start: 2017-04-24 | End: 2017-04-24

## 2017-04-24 RX ORDER — OXYCODONE HYDROCHLORIDE 5 MG/1
5 TABLET ORAL ONCE AS NEEDED
Status: ACTIVE | OUTPATIENT
Start: 2017-04-24 | End: 2017-04-24

## 2017-04-24 RX ORDER — LISINOPRIL 10 MG/1
10 TABLET ORAL DAILY
Status: DISCONTINUED | OUTPATIENT
Start: 2017-04-25 | End: 2017-04-25 | Stop reason: HOSPADM

## 2017-04-24 RX ORDER — ACETAMINOPHEN 10 MG/ML
1000 INJECTION, SOLUTION INTRAVENOUS EVERY 8 HOURS
Status: DISCONTINUED | OUTPATIENT
Start: 2017-04-24 | End: 2017-04-25 | Stop reason: HOSPADM

## 2017-04-24 RX ORDER — ATORVASTATIN CALCIUM 20 MG/1
20 TABLET, FILM COATED ORAL DAILY
Status: DISCONTINUED | OUTPATIENT
Start: 2017-04-25 | End: 2017-04-25 | Stop reason: HOSPADM

## 2017-04-24 RX ORDER — ONDANSETRON 2 MG/ML
4 INJECTION INTRAMUSCULAR; INTRAVENOUS EVERY 12 HOURS PRN
Status: DISCONTINUED | OUTPATIENT
Start: 2017-04-24 | End: 2017-04-25 | Stop reason: HOSPADM

## 2017-04-24 RX ADMIN — FAMOTIDINE 20 MG: 20 TABLET, FILM COATED ORAL at 08:04

## 2017-04-24 RX ADMIN — LIDOCAINE HYDROCHLORIDE 100 MG: 20 INJECTION, SOLUTION INTRAVENOUS at 11:04

## 2017-04-24 RX ADMIN — MIDAZOLAM HYDROCHLORIDE 2 MG: 1 INJECTION, SOLUTION INTRAMUSCULAR; INTRAVENOUS at 10:04

## 2017-04-24 RX ADMIN — ACETAMINOPHEN 1000 MG: 10 INJECTION, SOLUTION INTRAVENOUS at 07:04

## 2017-04-24 RX ADMIN — ROPIVACAINE HYDROCHLORIDE 3 ML: 5 INJECTION, SOLUTION EPIDURAL; INFILTRATION; PERINEURAL at 10:04

## 2017-04-24 RX ADMIN — OXYCODONE HYDROCHLORIDE 10 MG: 5 TABLET ORAL at 08:04

## 2017-04-24 RX ADMIN — CEFAZOLIN SODIUM 2 G: 2 SOLUTION INTRAVENOUS at 11:04

## 2017-04-24 RX ADMIN — DEXTROSE AND SODIUM CHLORIDE: 5; .9 INJECTION, SOLUTION INTRAVENOUS at 02:04

## 2017-04-24 RX ADMIN — DOCUSATE SODIUM 100 MG: 100 CAPSULE, LIQUID FILLED ORAL at 08:04

## 2017-04-24 RX ADMIN — CEFAZOLIN SODIUM 2 G: 2 SOLUTION INTRAVENOUS at 08:04

## 2017-04-24 RX ADMIN — MIDAZOLAM HYDROCHLORIDE 1 MG: 1 INJECTION, SOLUTION INTRAMUSCULAR; INTRAVENOUS at 01:04

## 2017-04-24 RX ADMIN — PROPOFOL 100 MCG/KG/MIN: 10 INJECTION, EMULSION INTRAVENOUS at 11:04

## 2017-04-24 RX ADMIN — ACETAMINOPHEN 1000 MG: 10 INJECTION, SOLUTION INTRAVENOUS at 11:04

## 2017-04-24 RX ADMIN — TRANEXAMIC ACID 800 MG: 100 INJECTION, SOLUTION INTRAVENOUS at 11:04

## 2017-04-24 RX ADMIN — SODIUM CHLORIDE, SODIUM LACTATE, POTASSIUM CHLORIDE, AND CALCIUM CHLORIDE: 600; 310; 30; 20 INJECTION, SOLUTION INTRAVENOUS at 10:04

## 2017-04-24 RX ADMIN — ONDANSETRON 4 MG: 2 INJECTION, SOLUTION INTRAMUSCULAR; INTRAVENOUS at 11:04

## 2017-04-24 RX ADMIN — OXYCODONE HYDROCHLORIDE 5 MG: 5 TABLET ORAL at 03:04

## 2017-04-24 RX ADMIN — OXYCODONE HYDROCHLORIDE 5 MG: 5 TABLET ORAL at 01:04

## 2017-04-24 RX ADMIN — SODIUM CHLORIDE, SODIUM LACTATE, POTASSIUM CHLORIDE, AND CALCIUM CHLORIDE: 600; 310; 30; 20 INJECTION, SOLUTION INTRAVENOUS at 12:04

## 2017-04-24 RX ADMIN — MUPIROCIN 1 G: 20 OINTMENT TOPICAL at 08:04

## 2017-04-24 RX ADMIN — OXYCODONE HYDROCHLORIDE 10 MG: 5 TABLET ORAL at 11:04

## 2017-04-24 RX ADMIN — VANCOMYCIN HYDROCHLORIDE 1250 MG: 1 INJECTION, POWDER, LYOPHILIZED, FOR SOLUTION INTRAVENOUS at 10:04

## 2017-04-24 RX ADMIN — VANCOMYCIN HYDROCHLORIDE 1000 MG: 1 INJECTION, POWDER, LYOPHILIZED, FOR SOLUTION INTRAVENOUS at 10:04

## 2017-04-24 NOTE — PROGRESS NOTES
Unable to instruct pt on IS due to PT/OT was with pt at time IS was brought to room. IS left at bedside.

## 2017-04-24 NOTE — OP NOTE
[facility]  Operative Report    SUMMARY     Surgery Date: 4/24/2017     Surgeon(s) and Role:     * Harish Juilan MD - Primary    Assistant: JOANNA Meyer FA    Pre-op Diagnosis:  Primary osteoarthritis of left knee [M17.12]    Post-op Diagnosis:  Post-Op Diagnosis Codes:     * Primary osteoarthritis of left knee [M17.12]    Procedure(s) (LRB):  REPLACEMENT-KNEE WITH NAVIGATION (Left) (Sujata Persona PS)    Anesthesia: Spinal    Description of Procedure: The appropriate consent was signed. The patient understood and except all risks and complications. The patient was brought to the Operating Room after undergoing spinal anesthetic. Tourniquet was applied to the proximal operative leg. The lower extremity was then prepped and draped in a sterile manner. After exsanguination of Esmarch bandage, tourniquet was inflated to 300 mmHg. An anterior incision with a medial parapatellar arthrotomy was performed. The menisci, anterior cruciate ligament and patellar fat pad were excised. The patella was resurfaced and sized to a 35 mm patella.   Three holes were then drilled for the lugs and this was trialed. A hole was then  drilled in the distal femur, cathy was placed down the femoral canal and the   distal femur cut in 6 degrees of valgus. The tibia was then cut utilizing the   Sujata navigation system in 0 degree varus valgus with 5 degrees in posterior   slope. Extension block was placed and full extension was noted. The femur was   then sized to a #6 and #6 cutting block was placed and the anterior and   posterior cuts as well as chamfer cuts were performed. The notch was cut for the posterior stabilized component. The tibia was sized to a  size D and a trial was performed.Trials were performed and a 11 mm PS spacer was felt to be appropriate.The tibia was then prepared with the drill and the punch, and trials were   removed and the knee washed out. Aquamantys was used to paint the posterior   capsule  and corners. Palacos cement with gentamicin was then mixed and   pressurized sequentially in tibia, femur and patella. Components were impacted   and excess cement was removed. A trial 11 mm PS spacer was placed and knee   brought out to full extension. Once the cement was allowed to harden, the 11 mm PS  spacer was felt to be appropriate and this was locked into the tibial   baseplate. Tourniquet was deflated and hemostasis was obtained. Good patellar   tracking was noted. Exparel cocktail was injected into the fascia and   subcutaneous tissue. The fascial closure was obtained with a running #2 Quill suture. Subcutaneous closure was obtained with #1 and 2-0 Vicryl. Skin was then closed with the staples and a sterile compressive dressing was applied. The patient was then brought to the Recovery Room in a good condition.        Estimated Blood Loss: 50cc         Specimens:   Specimen     None

## 2017-04-24 NOTE — INTERVAL H&P NOTE
The patient has been examined and the H&P has been reviewed:    I concur with the findings and no changes have occurred since H&P was written.    Anesthesia/Surgery risks, benefits and alternative options discussed and understood by patient/family.          Active Hospital Problems    Diagnosis  POA    Primary osteoarthritis of left knee [M17.12]  Yes      Resolved Hospital Problems    Diagnosis Date Resolved POA   No resolved problems to display.

## 2017-04-24 NOTE — IP AVS SNAPSHOT
[facility] Location ([facility])  [address] [place] [address]  Phone: [phone]           Patient Discharge Instructions   Our goal is to set you up for success. This packet includes information on your condition, medications, and your home care.  It will help you care for yourself to prevent having to return to the hospital.     Please ask your nurse if you have any questions.      There are many details to remember when preparing to leave the hospital. Here is what you will need to do:    1. Take your medicine. If you are prescribed medications, review your Medication List on the following pages. You may have new medications to  at the pharmacy and others that you'll need to stop taking. Review the instructions for how and when to take your medications. Talk with your doctor or nurses if you are unsure of what to do.     2. Go to your follow-up appointments. Specific follow-up information is listed in the following pages. Your may be contacted by a nurse or clinical provider about future appointments. Be sure we have all of the phone numbers to reach you. Please contact your provider's office if you are unable to make an appointment.     3. Watch for warning signs. Your doctor or nurse will give you detailed warning signs to watch for and when to call for assistance. These instructions may also include educational information about your condition. If you experience any of warning signs to your health, call your doctor.               ** Verify the list of medication(s) below is accurate and up to date. Carry this with you in case of emergency. If your medications have changed, please notify your healthcare provider.             Medication List      START taking these medications        Additional Info                      aspirin 325 MG tablet   Refills:  0   Dose:  325 mg    Instructions:  Take 1 tablet (325 mg total) by mouth every 12 (twelve) hours. For  4 weeks     Begin Date    AM     Noon    PM    Bedtime       oxycodone-acetaminophen 5-325 mg per tablet   Commonly known as:  PERCOCET   Quantity:  90 tablet   Refills:  0    Instructions:  1 tab every 4 hours PRN pain or 2 tablets every 6 hours PRN pain     Begin Date    AM    Noon    PM    Bedtime         CONTINUE taking these medications        Additional Info                      atorvastatin 20 MG tablet   Commonly known as:  LIPITOR   Refills:  0   Dose:  20 mg    Instructions:  Take 20 mg by mouth once daily.     Begin Date    AM    Noon    PM    Bedtime       fexofenadine 60 MG tablet   Commonly known as:  ALLEGRA   Refills:  0   Dose:  60 mg    Instructions:  Take 60 mg by mouth once daily.     Begin Date    AM    Noon    PM    Bedtime       fluticasone 50 mcg/actuation nasal spray   Commonly known as:  FLONASE   Refills:  0   Dose:  1 spray    Instructions:  1 spray by Each Nare route once daily.     Begin Date    AM    Noon    PM    Bedtime       lisinopril 10 MG tablet   Refills:  0   Dose:  10 mg    Instructions:  Take 10 mg by mouth once daily.     Begin Date    AM    Noon    PM    Bedtime       medroxyPROGESTERone 2.5 MG tablet   Commonly known as:  PROVERA   Refills:  0   Dose:  1.25 mg    Instructions:  Take 1.25 mg by mouth once daily.     Begin Date    AM    Noon    PM    Bedtime       PROTONIX 20 MG tablet   Refills:  0   Dose:  20 mg   Generic drug:  pantoprazole    Instructions:  Take 20 mg by mouth once daily.     Begin Date    AM    Noon    PM    Bedtime       SYNTHROID 25 MCG tablet   Refills:  0   Dose:  25 mcg   Generic drug:  levothyroxine    Instructions:  Take 25 mcg by mouth once daily.     Begin Date    AM    Noon    PM    Bedtime       terbinafine HCl 1 % cream   Commonly known as:  LAMISIL   Refills:  0    Instructions:  Apply topically 2 (two) times daily.     Begin Date    AM    Noon    PM    Bedtime         STOP taking these medications     ALIGN ORAL       CALCIUM 500 + D 500 mg(1,250mg) -400 unit Tab  "  Generic drug:  calcium carbonate-vitamin D3       CENTRUM SILVER ULTRA WOMEN'S ORAL       diclofenac-misoprostol  mg-mcg  mg-mcg per tablet   Commonly known as:  ARTHROTEC 75       fish oil-omega-3 fatty acids 300-1,000 mg capsule       hydroxychloroquine 200 mg tablet   Commonly known as:  PLAQUENIL       mupirocin 2 % ointment   Commonly known as:  BACTROBAN       TYLENOL 8 HOUR 650 MG Tbsr   Generic drug:  acetaminophen       vitamin E 400 UNIT capsule            Where to Get Your Medications      You can get these medications from any pharmacy     Bring a paper prescription for each of these medications     oxycodone-acetaminophen 5-325 mg per tablet       You don't need a prescription for these medications     aspirin 325 MG tablet                  Please bring to all follow up appointments:    1. A copy of your discharge instructions.  2. All medicines you are currently taking in their original bottles.  3. Identification and insurance card.    Please arrive 15 minutes ahead of scheduled appointment time.    Please call 24 hours in advance if you must reschedule your appointment and/or time.        Follow-up Information     Follow up with Harish Julian MD In 4 weeks.    Specialty:  Orthopedic Surgery    Contact information:    [address]  [facility]  [facility] [id number] [phone]          Follow up with [facility].    Specialties:  DME Provider, Home Health Services    Why:  Home Health    Contact information:    [address] [place] [address]  [phone]          Follow up with [facility]    Specialty:  DME Provider    Why:  [facility]    Contact information:    [address]  [facility]  [facility] [address]  [phone]          Discharge Instructions     Future Orders    3 IN 1 COMMODE FOR HOME USE     Questions:    Type:  Standard    Height:  5' 5" (1.651 m)    Weight:  80.7 kg (178 lb)    Does patient have medical equipment at " "home?:  rollator    Length of need (1-99 months):  99    CANE FOR HOME USE     Questions:    Type of Cane:  Straight    Height:  5' 5" (1.651 m)    Weight:  80.7 kg (178 lb)    Does patient have medical equipment at home?:  rollator    Length of need (1-99 months):  99    Diet general     Questions:    Total calories:      Fat restriction, if any:      Protein restriction, if any:      Na restriction, if any:      Fluid restriction:      Additional restrictions:      WALKER FOR HOME USE     Questions:    Type of Walker:  Adult (5'4"-6'6")    With wheels?:  Yes    Height:  5' 5" (1.651 m)    Weight:  80.3 kg (177 lb)    Length of need (1-99 months):  99    Platform attachment:      Accessories/Other:      Assistance needed:      Does patient have medical equipment at home?:  walker, standard    Please check all that apply:  Patient's condition impairs ambulation.    WALKER FOR HOME USE     Questions:    Type of Walker:  Adult (5'4"-6'6")    With wheels?:  Yes    Height:  5' 5" (1.651 m)    Weight:  80.7 kg (178 lb)    Does patient have medical equipment at home?:  rollator    Accessories/Other:      Assistance needed:      Length of need (1-99 months):  99    Weight bearing restrictions (specify)         Discharge Instructions       Knee Athroscopy Discharge Instructions    1) Pain: After surgery your knee will be sore. The knee will likely have been injected with a numbing medicine (Exparel) prior to completion of surgery for pain control. This is indicated on a green bracelet that you will continue to wear for 4 days after surgery. You will   also get a prescription for pain control before you leave the hospital. Ice and elevation will assist with pain control.    a) Apply ice as much as possible for the first 72 hours. After 72 hours, apply ice for 20minutes after therapy,after exercising or whenever experiencing pain. Avoid direct skin contact with ice to prevent frostbit.          b) Elevate the affected leg " with the pillow the length of the leg higher than your heart to assist with swelling and pain.  2) Incision Care:  a) Some drainage from the incision in the first 72 hours is normal. If drainage is excessive,remove bandage, clean with mild soap and water, pat dry, cover with sterile gauze and secure with tape. Notify physician about excessive drainage. Staples will be removed 14-21 days after surgery   3) Activity:  a) Perform exercises 2-3 x day.  b) You may shower 48 hours after surgery providing the dressing is waterproof. No tub or hot tub usage. Support help is mandatory during showering. If the dressing becomes wet, replace with a new dressing.   c) Wear thigh high yahaira hose stockings for 3 weeks after surgery .You may remove stockings for 1- 2 hours during the day only.  d) If your physician orders the CPM machine you are to use it for 2 hours in the am and 2 hours in the pm.Increase the flexion 5 degrees each session if tolerated. This is not to replace your exercise program.  4) For lifetime after your replacement surgery, you may need antibiotic coverage before dental or minor surgical procedures.  5) Possible Complication:  a) Infection: Report these signs and symptoms to your surgeon.  i) Unexpected redness around incision   ii) Persistent drainage from wound after 72 hours.  iii) Temperature greater than 101 degrees F  iv) Additional swelling  v) Pain not controlled with current pain medication  b) Blood Clot: Report theses signs and symptoms to your surgeon  i) Unusual pain  ii) Red or discolored skin  iii) Swelling in the leg  iv) Unusual warm skin        Thank you for choosing [facility] as your Health Care Provider. [facility] strives to provide the best healthcare available to you. In the next few days you may receive a Survey, either by mail or email,  asking you to rate our care that was provided to you during your stay.  Please return the survey to us, as your feedback is important. We  "aim to meet your expectations of safe, quality health care.    From your [facility] Team.      Primary Diagnosis     Your primary diagnosis was:  Degenerative Joint Disease Of Lower Leg      Admission Information     Date & Time Provider Department CSN    4/24/2017  8:14 AM Harish Julian MD [facility] [id number]      Care Providers     Provider Role Specialty Primary office phone    Harish Julian MD Attending Provider Orthopedic Surgery [phone]    Harish Julian MD Surgeon  Orthopedic Surgery [phone]    Little Clements MD Consulting Physician  Nephrology [phone]      Your Vitals Were     BP Pulse Temp Resp Height Weight    151/89 (BP Location: Left arm, Patient Position: Lying, BP Method: Automatic) 94 98.4 °F (36.9 °C) (Oral) 18 5' 5" (1.651 m) 80.7 kg (178 lb)    SpO2 BMI             98% 29.62 kg/m2         Recent Lab Values     No lab values to display.      Allergies as of 4/25/2017        Reactions    Avelox [Moxifloxacin] Anaphylaxis         Demerol [Meperidine] Anaphylaxis    Ativan [Lorazepam] Other (See Comments)    Agitation      [facility]     [facility] Nurse Care Line - 24/7 Assistance  Unless otherwise directed by your provider, please contact [facility], our nurse care line that is available for 24/7 assistance.     Registered nurses in the [facility] provide clinical advisement, health education, appointment booking, and other advisory services.  Call for this free service at [phone].        Advance Directives     An advance directive is a document which, in the event you are no longer able to make decisions for yourself, tells your healthcare team what kind of treatment you do or do not want to receive, or who you would like to make those decisions for you.  If you do not currently have an advance directive, [facility] encourages you to create one.  For more information call:  [phone]), " [phone] ([phone]),  or log on to [email or web address].        Language Assistance Services     ATTENTION: Language assistance services are available, free of charge. Please call [phone].      ATENCIÓN: Si olu kirkland, tiene a awng disposición servicios gratuitos de asistencia lingüística. Llame al [phone].     CHÚ Ý: N?u b?n nói Ti?ng Vi?t, có các d?ch v? h? tr? ngôn ng? mi?n phí dành cho b?n. G?i s? [phone].        [facility] Sign-Up     Activating your [facility] account is as easy as 1-2-3!     1) Visit [email or web address], select Sign Up Now, enter this activation code and your date of birth, then select Next.  [email or web address]  Expires: 5/26/2017 10:32 AM      2) Create a username and password to use when you visit [facility] in the future and select a security question in case you lose your password and select Next.    3) Enter your e-mail address and click Sign Up!    Additional Information  If you have questions, please e-mail [email or web address] or call [phone] to talk to our [facility] staff. Remember, [facility] is NOT to be used for urgent needs. For medical emergencies, dial 911.          [facility] complies with applicable Federal civil rights laws and does not discriminate on the basis of race, color, national origin, age, disability, or sex.

## 2017-04-24 NOTE — PT/OT/SLP EVAL
Physical Therapy  Evaluation and Treatment    Nella Lewis   MRN: [id number]   Admitting Diagnosis: Pre-op Diagnosis: Primary osteoarthritis of left knee [M17.12] s/p Procedure(s):  REPLACEMENT-KNEE WITH NAVIGATION     PT Received On: 17  PT Start Time: 1458     PT Stop Time: 1526    PT Total Time (min): 28 min       Billable Minutes:  Evaluation 10 and Gait [id number]    Diagnosis: Pre-op Diagnosis: Primary osteoarthritis of left knee [M17.12] s/p Procedure(s):  REPLACEMENT-KNEE WITH NAVIGATION     Past Medical History:   Diagnosis Date    Arthritis     rheumatoid    Elevated cholesterol     Fibromyalgia     Hypertension     Thyroid disease       Past Surgical History:   Procedure Laterality Date    ABDOMINAL SURGERY  2016    resection    EYE SURGERY Bilateral     lasix    HYSTERECTOMY      JOINT REPLACEMENT Right 2015     Referring physician: MAIRA Benjaimn  Date referred to PT: 2017    General Precautions: Standard, fall  Orthopedic Precautions: Full weight bearing     Do you have any cultural, spiritual, Episcopal conflicts, given your current situation?: None specified    Patient History:  Living Environment Comment: Pt reports she lives with her  in a 1 story house with 1 YURIY or a ramp entrance through another entrance area. She uses a rollator prn in the community mod (I) and reports being (I) with ADLs PTA.   Equipment Currently Used at Home: rollator  DME owned (not currently used): standard walker, bedside commode and shower chair    Previous Level of Function:  Ambulation Skills: needs device  Transfer Skills: independent  ADL Skills: independent  Work/Leisure Activity: independent    Subjective:  Communicated with RN prior to session.    Chief Complaint: None stated   Patient goals: To return to PLOF    Pain Ratin/10   Pain Rating Post-Intervention: 0/10    Objective:   Patient found with: peripheral IV, cuevas catheter     Cognitive Exam:  Oriented to: Person and  Place time and situation    Follows Commands/attention: Follows one-step commands  Communication: clear/fluent  Safety awareness/insight to disability: intact    Physical Exam:  Postural examination/scapula alignment: No postural abnormalities identified    Skin integrity: Visible skin intact  Edema: None noted     Sensation:   Intact    Lower Extremity Range of Motion:  Right Lower Extremity: WFL except knee flexion NT  Left Lower Extremity: WFL    Lower Extremity Strength:  Right Lower Extremity: ankle DF: 4; knee extension; 3; hip flexion: 3  Left Lower Extremity: WFL    Gross motor coordination: WFL    Functional Mobility:  Bed Mobility:  Supine to Sit: Stand by Assistance (with HOB elevated)    Transfers:  Sit <> Stand Assistance: Contact Guard Assistance (pt required verbal cues for hand placement and safety)  Sit <> Stand Assistive Device: Rolling Walker    Gait:   Gait Distance: x 250'; pt required verbal cues for increased step length, walker management, and heel-toe gait pattern  Assistance 1: Contact Guard Assistance  Gait Assistive Device: Rolling walker  Gait Pattern: reciprocal  Gait Deviation(s): decreased viri, decreased toe-to-floor clearance, decreased velocity of limb motion, decreased stride length, decreased step length    Balance:   Static Sit: GOOD: Takes MODERATE challenges from all directions  Dynamic Sit: GOOD: Maintains balance through MODERATE excursions of active trunk movement  Static Stand: GOOD: Takes MODERATE challenges from all directions  Dynamic stand: GOOD: Needs SUPERVISION only during gait and able to self right with moderate     Therapeutic Activities and Exercises:  Pt performed 1 set of 10 reps of RLE  1. Glut sets  2. Quad sets  3. Ankle pumps  5. SLR  6. Knee flexion (heel slides)  7. Long arc quads  Pt required verbal cues, tactile cues and visual cues for technique in order to complete.     AM-PAC 6 CLICK MOBILITY  How much help from another person does this patient  currently need?   1 = Unable, Total/Dependent Assistance  2 = A lot, Maximum/Moderate Assistance  3 = A little, Minimum/Contact Guard/Supervision  4 = None, Modified [place]/Independent    Turning over in bed (including adjusting bedclothes, sheets and blankets)?: 4  Sitting down on and standing up from a chair with arms (e.g., wheelchair, bedside commode, etc.): 3  Moving from lying on back to sitting on the side of the bed?: 3  Moving to and from a bed to a chair (including a wheelchair)?: 3  Need to walk in hospital room?: 3  Climbing 3-5 steps with a railing?: 3  Total Score: 19     AM-PAC Raw Score CMS G-Code Modifier Level of Impairment Assistance   6 % Total / Unable   7 - 9 CM 80 - 100% Maximal Assist   10 - 14 CL 60 - 80% Moderate Assist   15 - 19 CK 40 - 60% Moderate Assist   20 - 22 CJ 20 - 40% Minimal Assist   23 CI 1-20% SBA / CGA   24 CH 0% Independent/ Mod I     Patient left up in chair with all lines intact, call button in reach and RN notified and  present.     Assessment:   Nella Lewis is a 51 y.o. female with a medical diagnosis of Pre-op Diagnosis: Primary osteoarthritis of left knee [M17.12] s/p Procedure(s):  REPLACEMENT-KNEE WITH NAVIGATION   and presents with s/p R TKA. Pt tolerated initial evaluation and treatment session well with no complaints of increased pain. Pt would benefit from continued skilled PT to maximize independence and safety with functional mobility.    Rehab identified problem list/impairments: Rehab identified problem list/impairments: weakness, gait instability, decreased ROM, decreased lower extremity function, impaired balance, impaired self care skills, pain, decreased safety awareness    Rehab potential is good.    Activity tolerance: Good    Discharge recommendations: Discharge Facility/Level Of Care Needs: home health PT     Barriers to discharge: Barriers to Discharge: None    Equipment recommendations: Equipment Needed After Discharge:  " (wheels for RW)     GOALS:   Physical Therapy Goals        Problem: Physical Therapy Goal    Goal Priority Disciplines Outcome Goal Variances Interventions   Physical Therapy Goal     PT/OT, PT Ongoing (interventions implemented as appropriate)     Description:  Goals to be met by: 17     Patient will increase functional independence with mobility by performin. Supine to sit with modified independence.   2. Sit to supine with modified independence.   3. Sit<>stand transfer with modified independence using rolling walker.   4. Gait x 150 feet with modified independence using rolling walker.   5. Ascend/descend 1 6" curb step with RW SBA.             PLAN:    Patient to be seen BID to address the above listed problems via therapeutic activities, therapeutic exercises, gait training, neuromuscular re-education  Plan of Care expires: 17  Plan of Care reviewed with: patient, spouse    Dotty Duron, PT  2017  "

## 2017-04-24 NOTE — PLAN OF CARE
"Problem: Physical Therapy Goal  Goal: Physical Therapy Goal  Goals to be met by: 17     Patient will increase functional independence with mobility by performin. Supine to sit with modified independence.   2. Sit to supine with modified independence.   3. Sit<>stand transfer with modified independence using rolling walker.   4. Gait x 150 feet with modified independence using rolling walker.   5. Ascend/descend 1 6" curb step with RW SBA.   Outcome: Ongoing (interventions implemented as appropriate)  PT evaluation completed. Please see progress note for details, POC, and recommendations.       "

## 2017-04-24 NOTE — PROGRESS NOTES
SW acknowledged HH order and pt selected MD's preferred provider and signed patient choice form.  NBA sent HH order and medical records to [facility]  [facility].  Pt has walker, shower chair and bedside commode at home, need walker wiheels.  0.    NAB spoke to Carmen at [facility] and walker chair wheels are $19.00.  Pt agreed to purchase the walker wheels for $19.00.  SW delivered wheels and pt signed for delivery.    NBA spoke to Cristy at [facility] and walker wheel kit is only for [employer].  Pt doesn't know brand of walker at home and didn't get from [facility] and walker is at home and no one is there to check brand.  Pt would like to purchase rolling walker.

## 2017-04-24 NOTE — ANESTHESIA POSTPROCEDURE EVALUATION
"Anesthesia Post Evaluation    Patient: Nella Lewis    Procedure(s) Performed: Procedure(s) (LRB):  REPLACEMENT-KNEE WITH NAVIGATION (Left)    Final Anesthesia Type: spinal  Patient location during evaluation: PACU  Patient participation: Yes- Able to Participate  Level of consciousness: awake and alert  Post-procedure vital signs: reviewed and stable  Pain management: adequate  Airway patency: patent  PONV status at discharge: No PONV  Anesthetic complications: no      Cardiovascular status: blood pressure returned to baseline and stable  Respiratory status: unassisted, spontaneous ventilation and room air  Hydration status: euvolemic  Follow-up not needed.        Visit Vitals    BP (!) 173/99    Pulse 93    Temp 36.6 °C (97.9 °F) (Oral)    Resp 18    Ht 5' 5" (1.651 m)    Wt 80.3 kg (177 lb)    SpO2 99%    Breastfeeding No    BMI 29.45 kg/m2       Pain/Tee Score: Pain Assessment Performed: Yes (4/24/2017  8:55 AM)  Presence of Pain: denies (4/24/2017 12:55 PM)  Tee Score: 10 (4/24/2017  1:10 PM)      "

## 2017-04-24 NOTE — PLAN OF CARE
[facility]       [address]       [place] [address]       [phone]               [facility] Orthopedic Discharge Orders    Home Rohit           Expected Discharge Date: 4/25-4/26    Diagnoses:  Post-op  knee replacement    Patient is homebound due to:   Pt requires home health services due to taxing effort to leave the home as a result of immobility from Post-op knee replacement      Weight Bearing Status:   full weight bearing: left leg    CPM: for 3 weeks (2 hours in the morning, 2 hours in the evening); increase by 5 degrees each day until maximum amount then continue to use at the maximum degrees.    TKR:  CPM use should total at least 4-6 hours daily. Start CPM setting around the PROM measurement at Eval.  Progress 5 degrees daily as tolerated until max setting is achieved.  Continue CPM use for 3 weeks post-op then CPM provider [facility] will contact you for CPM pickup.     Physical Therapy   3 times a week for 2 weeks (Call if further orders are needed)  - Ambulate with a rolling walker  - Progress to cane  - Instruct on ROM and strengthening of knee    Aide to provide assistance with personal care and  ADLs  3 times a week    Wound Care:   If patient is discharged with aqua modesta/silver dressing, leave on for 5 days unless saturated border to border, then follow instructions below:  Cleanse with wound cleanser or normal saline and apply Mepore Pro dressing.  If Mepore pro not available apply gauze and tegaderm.  Change 3 times a week or PRN if dry.  Teach patient to change daily if draining.        Contact:  Please contact the nurse practitionerMaria G at [phone] at Ext [phone]. with concerns.  She is in surgery M,W,F so if urgent and needs to be addressed prior to the end of the day call the  and they with contact her in the OR or Clinic.       BLOOD THINNER:    If sent home on Xarelto         -14 days post-op for TKR       -30 days post-op for THR     If sent home  home on ASA    325mg   BID x 4 weeks     Once finished with prescribed blood thinner, patients can return to pre-surgical ASA dosage if they took ASA before surgery.     Home Health Nurse for Wound Checks and to remove staples on POD #  14  PT/SN to remove staples 14 days Post-op and apply skin prep and steri-strips.    On dressing change, apply new dressing while knee in flexed position.    Pt may shower if incision dressing has waterproof dressing in place. Removal and replacement of dressing after shower only needed if incision is suspected to have gotten wet during shower.  Otherwise change as previously described depending on dressing/drainage    No soaking in the tub or hot tub use. Cold therapy/Ice encouraged at least 20 minutes 2-3 times daily or more if desired.  Incision must be kept waterproof while icing.      FWB unless otherwise indicated.  Progress to cane as able.  Set up for outpatient PT as soon as able after staple removal once patient is MOD I with cane.    Outpatient Therapy: [facility]    [address] [address]   or  [address] [place] [address]    Call [phone] to schedule appointment  Fax [phone]    If need orders: Call Rohini at Ext [phone]      Wear TEDS Bilateral Thigh High Stockings for 3 weeks  Gabe hose x 3 weeks. Ok to remove gabe hose 1-2 hours/day max if desired.       DME:  - rolling Walker  - 3 in 1 commode  - tub bench / shower chair  - Per PT/OT recommendation          Maria G Benjamin

## 2017-04-24 NOTE — ANESTHESIA PROCEDURE NOTES
Spinal    Diagnosis: Primary osteoarthritis of left knee   Patient location during procedure: holding area  Start time: 4/24/2017 10:39 AM  Timeout: 4/24/2017 10:38 AM  End time: 4/24/2017 10:47 AM  Staffing  Anesthesiologist: ANSON FOSTER  Performed by: anesthesiologist   Preanesthetic Checklist  Completed: patient identified, site marked, surgical consent, pre-op evaluation, timeout performed, IV checked, risks and benefits discussed and monitors and equipment checked  Spinal Block  Patient position: sitting  Prep: ChloraPrep  Patient monitoring: heart rate, cardiac monitor and continuous pulse ox (frequent BP)  Approach: midline  Location: L3-4  Injection technique: single shot  CSF Fluid: clear free-flowing CSF  Needle  Needle type: pencil-tip   Needle gauge: 25 G  Needle length: 3.5 in  Additional Documentation: incremental injection, negative aspiration for heme and no paresthesia on injection  Needle localization: anatomical landmarks  Assessment  Ease of block: easy  Patient's tolerance of the procedure: comfortable throughout block and no complaints  Medications:  Bolus administered: 3 mL of 0.5 ropivacaine  Epinephrine added: none  Additional Notes  roni [id number], exp 12/20

## 2017-04-24 NOTE — PLAN OF CARE
Patient prefers to have significant other Julio Thakur present for discharge teaching. Please contact them @[phone].

## 2017-04-24 NOTE — PLAN OF CARE
SW met with pt in OR presurgery area and completed assessment. Pt lives with Julio, significant other, and he assists with bathing and dressing.  Pt has standard walker, shower chair and bedside commode at home.  Pt will discharge with [facility].     04/24/17 1228   Discharge Assessment   Assessment Type Discharge Planning Assessment   Confirmed/corrected address and phone number on facesheet? Yes   Assessment information obtained from? Patient   Prior to hospitilization cognitive status: Alert/Oriented   Prior to hospitalization functional status: Needs Assistance   Current cognitive status: Alert/Oriented   Current Functional Status: Needs Assistance;Assistive Equipment   Arrived From home or self-care   Lives With significant other   Able to Return to Prior Arrangements yes   Is patient able to care for self after discharge? Unable to determine at this time (comments)   Who are your caregiver(s) and their phone number(s)? (Julio, significant other, [phone])   Patient's perception of discharge disposition home health   Readmission Within The Last 30 Days no previous admission in last 30 days   Patient currently being followed by outpatient case management? No   Patient currently receives home health services? No   Patient currently receives private duty nursing? No   Patient currently receives any other outside agency services? No   Equipment Currently Used at Home walker, standard   Do you have any problems affording any of your prescribed medications? No   Is the patient taking medications as prescribed? yes   Do you have any financial concerns preventing you from receiving the healthcare you need? No   Does the patient have transportation to healthcare appointments? Yes   Transportation Available family or friend will provide   On Dialysis? No   Does the patient receive services at the Coumadin Clinic? No   Are there any open cases? No   Discharge Plan A Home Health   Patient/Family In Agreement With Plan yes

## 2017-04-24 NOTE — CONSULTS
Consult Note  Internal Medicine    Consult Requested By: Harish Julian MD  Reason for Consult: HTN    SUBJECTIVE:     History of Present Illness:  Patient is a 51 y.o. female s/p Left TKA.  Denies CP, SOB, Palps N/V, Calf pain, rash..    Past Medical History:   Diagnosis Date    Arthritis     rheumatoid    Elevated cholesterol     Fibromyalgia     Hypertension     Thyroid disease      Past Surgical History:   Procedure Laterality Date    ABDOMINAL SURGERY  11/2016    resection    EYE SURGERY Bilateral     lasix    HYSTERECTOMY      JOINT REPLACEMENT Right 2015     History reviewed. No pertinent family history.  Social History   Substance Use Topics    Smoking status: Never Smoker    Smokeless tobacco: None    Alcohol use 0.6 oz/week     1 Glasses of wine per week      Comment: weekend       Prescriptions Prior to Admission   Medication Sig Dispense Refill Last Dose    acetaminophen (TYLENOL 8 HOUR) 650 MG TbSR Take 650 mg by mouth every 8 (eight) hours.   4/23/2017 at Unknown time    atorvastatin (LIPITOR) 20 MG tablet Take 20 mg by mouth once daily.   Past Week at Unknown time    BIFIDOBACTERIUM INFANTIS (ALIGN ORAL) Take 4 mg by mouth once daily.   Past Week at Unknown time    calcium carbonate-vitamin D3 (CALCIUM 500 + D) 500 mg(1,250mg) -400 unit Tab Take 1 tablet by mouth once daily.   Past Week at Unknown time    diclofenac-misoprostol  mg-mcg (ARTHROTEC 75)  mg-mcg per tablet Take 1 tablet by mouth once daily.   Past Week at Unknown time    fexofenadine (ALLEGRA) 60 MG tablet Take 60 mg by mouth once daily.   4/23/2017 at Unknown time    fish oil-omega-3 fatty acids 300-1,000 mg capsule Take 2 g by mouth once daily.   Past Week at Unknown time    fluticasone (FLONASE) 50 mcg/actuation nasal spray 1 spray by Each Nare route once daily.   4/23/2017 at Unknown time    hydroxychloroquine (PLAQUENIL) 200 mg tablet Take 200 mg by mouth 2 (two) times daily.   Past Week at Unknown  time    levothyroxine (SYNTHROID) 25 MCG tablet Take 25 mcg by mouth once daily.   4/24/2017 at 0500    lisinopril 10 MG tablet Take 10 mg by mouth once daily.   4/23/2017 at Unknown time    medroxyPROGESTERone (PROVERA) 2.5 MG tablet Take 1.25 mg by mouth once daily.   4/23/2017 at Unknown time    mupirocin (BACTROBAN) 2 % ointment Apply topically 2 (two) times daily.   4/24/2017 at 0500    MV,FE,MIN/LUTEIN (CENTRUM SILVER ULTRA WOMEN'S ORAL) Take 1 tablet by mouth once daily.   Past Week at Unknown time    pantoprazole (PROTONIX) 20 MG tablet Take 20 mg by mouth once daily.   4/23/2017 at Unknown time    terbinafine HCl (LAMISIL) 1 % cream Apply topically 2 (two) times daily.   Past Week at Unknown time    vitamin E 400 UNIT capsule Take 400 Units by mouth once daily.   Past Week at Unknown time       Review of patient's allergies indicates:   Allergen Reactions    Avelox [moxifloxacin] Anaphylaxis            Demerol [meperidine] Anaphylaxis    Ativan [lorazepam] Other (See Comments)     Agitation          Review of Systems:  Constitutional: No fever or chills, no weight changes.  Eyes: No visual changes or photophobia  HEENT: No nasal congestion or sore throat  Respiratory: No cough or shorness of breath  Cardiovascular: No chest pain or palpitations  Gastrointestinal: Good appetite, no nausea or vomiting, no change in bowel habits  Genitourinary: No hematuria or dysuria  Skin: No rash or pruritis  Hematologic/lymphatic: No easy bruising, bleeding or lymphadenopathy  Musculoskeletal: No arthralgias or myalgias  Neurological: No seizures or tremors  Endocrine: No heat/cold intolerance.  No polyuria/polydipsia.  Psychiatric:  No depression or anxiety.     OBJECTIVE:     Vital Signs (Most Recent)  Temp: 98.7 °F (37.1 °C) (04/24/17 1530)  Pulse: 94 (04/24/17 1530)  Resp: 18 (04/24/17 1530)  BP: (!) 161/90 (04/24/17 1530)  SpO2: 97 % (04/24/17 1530)    Vital Signs Range (Last 24H):  Temp:  [97.5 °F (36.4  °C)-98.7 °F (37.1 °C)]   Pulse:  []   Resp:  [18]   BP: (149-173)/()   SpO2:  [97 %-100 %]     Physical Exam:    General appearance: Well developed, well nourished  Head: Normocephalic, atraumatic  Eyes:  Conjunctivae nl. Sclera anicteric. PERRL.  HEENT: Lips, mucosa, and tongue normal; teeth and gums normal and oropharynx clear.  Neck: Supple, trachea midline, thyroid not enlarged,   Lungs: Clear to auscultation bilaterally and normal respiratory effort  Heart: Regular rate and rhythm, S1, S2 normal, no murmur, click, rub or gallop  Abdomen: Soft, non-tender non-distended; bowel sounds normal; no masses,  no organomegaly  Extremities: No cyanosis or clubbing. No edema, Left knee dressed no oozing  Pulses: 2+ and symmetric  Skin: Skin color, texture, turgor normal. No rashes or lesions  Lymph nodes: Cervical, supraclavicular, and axillary nodes normal.  Neurologic: Normal strength and tone. No focal numbness or weakness  Psychiatric:  Alert and oriented times 3.  Affect appropriate.     Diagnostic Results:  Labs: Reviewed  ECG: Reviewed  X-Ray: Reviewed    ASSESSMENT/PLAN:     1. HTN- home meds w/ hold parameters  2. Hypothyroisim-Conitnue synthroid  3. Rheumatoid Arthritis-Has been off of plaquenil and Arthrotec x 1 week. Asymptomatic  4. Hx PUD-PPI  5. HRT- Pt on Provera which is higher risk for DVT discussed risk/ benefit with patient and she is concerned about HRT withdrawal symptoms. We discussed decreasing to qod and she is agreeable to this and acknowledges that it still places her at increased risk for DVT c/w someone not taking HRT.  6. DVT prophylaxis-Pt. On ASA bid  7. S/p Left TKA-per ortho

## 2017-04-24 NOTE — PLAN OF CARE
04/24/17 1002   ED Admissions Case Approval   ED Admissions Case Approval (!) CM Approved  (IP )

## 2017-04-24 NOTE — PLAN OF CARE
4:45 PM   Up in chair at this time.  VSS on RA and afebrile this shift.  Tolerating pain on PO.  Surgical dressing in place, CDI, and post op limb neurovascularly intact this shift.  Teds, compression devices, and cold treatment in use and tolerated well.  CPM at bedside, as ordered.  Good appetite and good UOP this shift, dependent to [place].  Repositions self independently in bed and ambulating around room x1 person assist, with RW.  Knee  precautions maintained.   IS done independently.  [facility] consulted and following.  Free from injury or skin breakdown; Fall precautions maintained and call light in reach.  POC updated questions answered and comments acknowledged.

## 2017-04-24 NOTE — TRANSFER OF CARE
"Anesthesia Transfer of Care Note    Patient: Nella Lewis    Procedure(s) Performed: Procedure(s) (LRB):  REPLACEMENT-KNEE WITH NAVIGATION (Left)    Patient location: PACU    Anesthesia Type: spinal    Transport from OR: Transported from OR on 2-3 L/min O2 by NC with adequate spontaneous ventilation    Post pain: adequate analgesia    Post assessment: no apparent anesthetic complications    Post vital signs: stable    Level of consciousness: awake and alert    Nausea/Vomiting: no nausea/vomiting    Complications: none          Last vitals:   Visit Vitals    BP (!) 154/87 (BP Location: Right arm, Patient Position: Sitting, BP Method: Automatic)    Pulse 77    Temp 36.7 °C (98 °F) (Oral)    Resp 18    Ht 5' 5" (1.651 m)    Wt 80.3 kg (177 lb)    SpO2 98%    Breastfeeding No    BMI 29.45 kg/m2     "

## 2017-04-25 VITALS
TEMPERATURE: 98 F | SYSTOLIC BLOOD PRESSURE: 151 MMHG | HEIGHT: 65 IN | HEART RATE: 94 BPM | DIASTOLIC BLOOD PRESSURE: 89 MMHG | BODY MASS INDEX: 29.66 KG/M2 | WEIGHT: 178 LBS | RESPIRATION RATE: 18 BRPM | OXYGEN SATURATION: 98 %

## 2017-04-25 PROBLEM — M17.12 PRIMARY OSTEOARTHRITIS OF LEFT KNEE: Status: RESOLVED | Noted: 2017-04-24 | Resolved: 2017-04-25

## 2017-04-25 LAB
ERYTHROCYTE [DISTWIDTH] IN BLOOD BY AUTOMATED COUNT: 12.9 %
HCT VFR BLD AUTO: 38.7 %
HGB BLD-MCNC: 12.4 G/DL
MCH RBC QN AUTO: 27.9 PG
MCHC RBC AUTO-ENTMCNC: 32 %
MCV RBC AUTO: 87 FL
PLATELET # BLD AUTO: 250 K/UL
PMV BLD AUTO: 9.9 FL
RBC # BLD AUTO: 4.44 M/UL
WBC # BLD AUTO: 8.2 K/UL

## 2017-04-25 PROCEDURE — 97110 THERAPEUTIC EXERCISES: CPT

## 2017-04-25 PROCEDURE — 90471 IMMUNIZATION ADMIN: CPT | Performed by: ORTHOPAEDIC SURGERY

## 2017-04-25 PROCEDURE — 25000003 PHARM REV CODE 250: Performed by: NURSE PRACTITIONER

## 2017-04-25 PROCEDURE — 25000003 PHARM REV CODE 250

## 2017-04-25 PROCEDURE — 63600175 PHARM REV CODE 636 W HCPCS

## 2017-04-25 PROCEDURE — 36415 COLL VENOUS BLD VENIPUNCTURE: CPT

## 2017-04-25 PROCEDURE — 63600175 PHARM REV CODE 636 W HCPCS: Performed by: ORTHOPAEDIC SURGERY

## 2017-04-25 PROCEDURE — 25000003 PHARM REV CODE 250: Performed by: INTERNAL MEDICINE

## 2017-04-25 PROCEDURE — G0009 ADMIN PNEUMOCOCCAL VACCINE: HCPCS | Performed by: ORTHOPAEDIC SURGERY

## 2017-04-25 PROCEDURE — 97165 OT EVAL LOW COMPLEX 30 MIN: CPT

## 2017-04-25 PROCEDURE — 90670 PCV13 VACCINE IM: CPT | Performed by: ORTHOPAEDIC SURGERY

## 2017-04-25 PROCEDURE — 85027 COMPLETE CBC AUTOMATED: CPT

## 2017-04-25 PROCEDURE — 97116 GAIT TRAINING THERAPY: CPT

## 2017-04-25 PROCEDURE — 97530 THERAPEUTIC ACTIVITIES: CPT

## 2017-04-25 PROCEDURE — 3E0234Z INTRODUCTION OF SERUM, TOXOID AND VACCINE INTO MUSCLE, PERCUTANEOUS APPROACH: ICD-10-PCS | Performed by: ORTHOPAEDIC SURGERY

## 2017-04-25 PROCEDURE — 97535 SELF CARE MNGMENT TRAINING: CPT

## 2017-04-25 PROCEDURE — 94799 UNLISTED PULMONARY SVC/PX: CPT

## 2017-04-25 RX ORDER — ASPIRIN 325 MG
325 TABLET ORAL EVERY 12 HOURS
Refills: 0 | COMMUNITY
Start: 2017-04-25 | End: 2017-05-25

## 2017-04-25 RX ORDER — OXYCODONE AND ACETAMINOPHEN 5; 325 MG/1; MG/1
TABLET ORAL
Qty: 90 TABLET | Refills: 0 | Status: SHIPPED | OUTPATIENT
Start: 2017-04-25

## 2017-04-25 RX ADMIN — CELECOXIB 200 MG: 200 CAPSULE ORAL at 09:04

## 2017-04-25 RX ADMIN — LISINOPRIL 10 MG: 10 TABLET ORAL at 09:04

## 2017-04-25 RX ADMIN — POLYETHYLENE GLYCOL 3350 17 G: 17 POWDER, FOR SOLUTION ORAL at 09:04

## 2017-04-25 RX ADMIN — OXYCODONE HYDROCHLORIDE 10 MG: 5 TABLET ORAL at 04:04

## 2017-04-25 RX ADMIN — HYDROMORPHONE HYDROCHLORIDE 0.5 MG: 1 INJECTION, SOLUTION INTRAMUSCULAR; INTRAVENOUS; SUBCUTANEOUS at 07:04

## 2017-04-25 RX ADMIN — OXYCODONE HYDROCHLORIDE 10 MG: 5 TABLET ORAL at 09:04

## 2017-04-25 RX ADMIN — CEFAZOLIN SODIUM 2 G: 2 SOLUTION INTRAVENOUS at 04:04

## 2017-04-25 RX ADMIN — HYDROMORPHONE HYDROCHLORIDE 0.5 MG: 1 INJECTION, SOLUTION INTRAMUSCULAR; INTRAVENOUS; SUBCUTANEOUS at 01:04

## 2017-04-25 RX ADMIN — PNEUMOCOCCAL 13-VALENT CONJUGATE VACCINE 0.5 ML: 2.2; 2.2; 2.2; 2.2; 2.2; 4.4; 2.2; 2.2; 2.2; 2.2; 2.2; 2.2; 2.2 INJECTION, SUSPENSION INTRAMUSCULAR at 09:04

## 2017-04-25 RX ADMIN — LEVOTHYROXINE SODIUM 25 MCG: 25 TABLET ORAL at 09:04

## 2017-04-25 RX ADMIN — DOCUSATE SODIUM 100 MG: 100 CAPSULE, LIQUID FILLED ORAL at 09:04

## 2017-04-25 RX ADMIN — ACETAMINOPHEN 1000 MG: 10 INJECTION, SOLUTION INTRAVENOUS at 06:04

## 2017-04-25 RX ADMIN — ASPIRIN 325 MG ORAL TABLET 325 MG: 325 PILL ORAL at 09:04

## 2017-04-25 RX ADMIN — MUPIROCIN 1 G: 20 OINTMENT TOPICAL at 09:04

## 2017-04-25 NOTE — PT/OT/SLP PROGRESS
"Physical Therapy  Treatment    Nella Lewis   MRN: [id number]   Admitting Diagnosis: Primary osteoarthritis of left knee    PT Received On: 17  PT Start Time: 706     PT Stop Time: 745    PT Total Time (min): 39 min       Billable Minutes:  Gait [id number], Therapeutic Activity 10 and Therapeutic Exercise 15    Treatment Type: Treatment  PT/PTA: PTA     PTA Visit Number: 1       General Precautions: Standard, fall  Orthopedic Precautions: Full weight bearing   Braces: N/A    Do you have any cultural, spiritual, Zoroastrianism conflicts, given your current situation?: None specified    Subjective:  Communicated with nurse prior to session. Pt. Stated " I feel pretty good"     Pain Ratin/10   Pain Addressed: Pre-medicate for activity (IV pain medication )  Pain Rating Post-Intervention: 0/10    Objective:   Patient found with: peripheral IV, Polar ice supine in bed on CPM     Functional Mobility:  Bed Mobility:   Scooting/Bridging: Modified Independent  Supine to Sit: Modified Independent    Transfers:  Sit <> Stand Assistance: Supervision  Sit <> Stand Assistive Device: Rolling Walker    Gait:   Gait Distance: 400 feet   Assistance 1: Supervision  Gait Assistive Device: Rolling walker  Gait Pattern: reciprocal  Gait Deviation(s): decreased viri, decreased step length, decreased weight-shifting ability    Stairs:  Ascend/descend 6" curb step with RW with SBA for safety     Therapeutic Activities and Exercises:  Pt. Performed therex X 15 reps AROM per TKA AROM LLE     AM-PAC 6 CLICK MOBILITY  How much help from another person does this patient currently need?   1 = Unable, Total/Dependent Assistance  2 = A lot, Maximum/Moderate Assistance  3 = A little, Minimum/Contact Guard/Supervision  4 = None, Modified [place]/Independent    Turning over in bed (including adjusting bedclothes, sheets and blankets)?: 4  Sitting down on and standing up from a chair with arms (e.g., wheelchair, bedside commode, " etc.): 3  Moving from lying on back to sitting on the side of the bed?: 3  Moving to and from a bed to a chair (including a wheelchair)?: 3  Need to walk in hospital room?: 3  Climbing 3-5 steps with a railing?: 3  Total Score: 19    AM-PAC Raw Score CMS G-Code Modifier Level of Impairment Assistance   6 % Total / Unable   7 - 9 CM 80 - 100% Maximal Assist   10 - 14 CL 60 - 80% Moderate Assist   15 - 19 CK 40 - 60% Moderate Assist   20 - 22 CJ 20 - 40% Minimal Assist   23 CI 1-20% SBA / CGA   24 CH 0% Independent/ Mod I     Patient left up in chair with all lines intact, call button in reach and nurse notified.    Assessment:  Nella Lewis is a 51 y.o. female with a medical diagnosis of Primary osteoarthritis of left knee patient tolerated treatment session well and was highly motivated. Patient will cont. To benefit from skilled PT services to increase strength, endurance and functional mobility.     Rehab identified problem list/impairments: Rehab identified problem list/impairments: decreased ROM, impaired functional mobilty    Rehab potential is excellent.    Activity tolerance: Excellent    Discharge recommendations: Discharge Facility/Level Of Care Needs: home health PT     Barriers to discharge: Barriers to Discharge: None    Equipment recommendations: Equipment Needed After Discharge:  (none already delivered )     GOALS:   Physical Therapy Goals        Problem: Physical Therapy Goal    Goal Priority Disciplines Outcome Goal Variances Interventions   Physical Therapy Goal     PT/OT, PT Ongoing (interventions implemented as appropriate)     Description:  Goals to be met by: 17     Patient will increase functional independence with mobility by performin. Supine to sit with modified independence.   2. Sit to supine with modified independence.   3. Sit<>stand transfer with modified independence using rolling walker.   4. Gait x 150 feet with modified independence using rolling walker.  "  5. Ascend/descend 1 6" curb step with RW SBA.               PLAN:    Patient to be seen BID  to address the above listed problems via therapeutic activities, therapeutic exercises, gait training, neuromuscular re-education  Plan of Care expires: 05/24/17  Plan of Care reviewed with: patient, spouse         Dee Cardona, PTA  04/25/2017    "

## 2017-04-25 NOTE — PT/OT/SLP EVAL
"Occupational Therapy  Evaluation/Treatment/Discharge    Nella Lewis   MRN: [id number]   Admitting Diagnosis: Primary osteoarthritis of left knee, s/p (L) TKR    OT Date of Treatment: 04/25/17   OT Start Time: 0803  OT Stop Time: 0839  OT Total Time (min): 36 min    Billable Minutes:  Evaluation 20  Self Care/Home Management 16    Diagnosis: Primary osteoarthritis of left knee, s/p (L) TKR     Past Medical History:   Diagnosis Date    Arthritis     rheumatoid    Elevated cholesterol     Fibromyalgia     Hypertension     Thyroid disease       Past Surgical History:   Procedure Laterality Date    ABDOMINAL SURGERY  11/2016    resection    EYE SURGERY Bilateral     lasix    HYSTERECTOMY      JOINT REPLACEMENT Right 2015       Referring physician: AUGUST Benjamin (NP)  Date referred to OT: 04/24/17    General Precautions: Standard, fall  Orthopedic Precautions: Full weight bearing  Braces: N/A          Patient History:  Living Environment  Lives With: spouse  Living Arrangements: house  Home Accessibility: stairs to enter home  Home Layout: Able to live on 1st floor  Number of Stairs to Enter Home: 1 (also ramp access)  Stair Railings at Home: none  Transportation Available: family or friend will provide  Equipment Currently Used at Home: rollator (owns but not using SW, BSC and shower chair)    Prior level of function:   Bed Mobility/Transfers: needs device (rollator as needed)  Grooming: independent  Bathing: independent  Upper Body Dressing: independent  Lower Body Dressing: independent  Toileting: independent  Home Management Skills: needs assist  Driving License: Yes  Mode of Transportation: Car  Occupation: On disability  Leisure and Hobbies: gardening/clipping blueberries but has not been able to engage in these tasks recently     Dominant hand: right    Subjective:  Communicated with nsg prior to session.  "I like the CPM"  Chief Complaint: None  Patient/Family stated goals: Home    Pain Rating: " 0/10  Location - Side: Left     Location: knee  Pain Addressed: Pre-medicate for activity  Pain Rating Post-Intervention: 0/10    Objective:  Patient found with: peripheral IV, Polar ice    Cognitive Exam:  Oriented to: Person, Place, Time and Situation  Follows Commands/attention: Follows two-step commands  Communication: clear/fluent  Memory:  No Deficits noted  Safety awareness/insight to disability: intact  Coping skills/emotional control: Appropriate to situation    Visual/perceptual:  Intact    Physical Exam:  Postural examination/scapula alignment: Rounded shoulder  Skin integrity: Visible skin intact  Edema: Moderate (L) knee    Sensation:   Intact in (B) UEs    Upper Extremity Range of Motion:  Right Upper Extremity: WFL  Left Upper Extremity: WFL    Upper Extremity Strength:  Right Upper Extremity: WFL  Left Upper Extremity: WFL   Strength: Good    Fine motor coordination:   Intact    Gross motor coordination: WFL    Functional Mobility:  Bed Mobility:  Sit to Supine: Stand by Assistance, With side rail    Transfers:  Sit <> Stand Assistance: Stand By Assistance  Sit <> Stand Assistive Device: Rolling Walker  Bed <> Chair Technique: Stand Pivot  Bed <> Chair Transfer Assistance: Stand By Assistance  Bed <> Chair Assistive Device: Rolling Walker  Toilet Transfer Technique: Stand Pivot  Toilet Transfer Assistance: Stand By Assistance  Toilet Transfer Assistive Device: Rolling Walker    Functional Ambulation: SBA with RW with room    Activities of Daily Living:  Feeding Level of Assistance: Modified independent  UE Dressing Level of Assistance: Set-up Assistance  LE Dressing Level of Assistance: Minimum assistance (to initiate socks over toes seated in bedside chair)  Grooming Position: Standing at sink  Grooming Level of Assistance: Stand by assistance  Toileting Where Assessed: Toilet  Toileting Level of Assistance: Minimum assistance (clothing mgmt)    Balance:   Static Sit: GOOD: Takes MODERATE  "challenges from all directions  Dynamic Sit: GOOD: Maintains balance through MODERATE excursions of active trunk movement  Static Stand: FAIR+: Takes MINIMAL challenges from all directions  Dynamic stand: FAIR+: Needs CLOSE SUPERVISION during gait and is able to right self with minor LOB (SBA with RW)    Therapeutic Activities and Exercises:  Educ to role of OT, LBD technique, safe toilet transfers.  Verbalized and demonstrated understanding.    AM-PAC 6 CLICK ADL  How much help from another person does this patient currently need?  1 = Unable, Total/Dependent Assistance  2 = A lot, Maximum/Moderate Assistance  3 = A little, Minimum/Contact Guard/Supervision  4 = None, Modified [place]/Independent    Putting on and taking off regular lower body clothing? : 3  Bathing (including washing, rinsing, drying)?: 3  Toileting, which includes using toilet, bedpan, or urinal? : 3  Putting on and taking off regular upper body clothing?: 3  Taking care of personal grooming such as brushing teeth?: 3  Eating meals?: 4  Total Score: 19    AM-PAC Raw Score CMS "G-Code Modifier Level of Impairment Assistance   6 % Total / Unable   7 - 9 CM 80 - 100% Maximal Assist   10 - 14 CL 60 - 80% Moderate Assist   15 - 19 CK 40 - 60% Moderate Assist   20 - 22 CJ 20 - 40% Minimal Assist   23 CI 1-20% SBA / CGA   24 CH 0% Independent/ Mod I       Patient left HOB elevated with all lines intact, call button in reach, nsg notified and  present    Assessment:  Nella Lewis is a 51 y.o. female with a medical diagnosis of Primary osteoarthritis of left knee and presents at Min assist to SBA for basic ADL tasks.  Recommend  OT and PT services, as well as RW or wheels for SW.  Pt's  is able to assist at patient's present level.  Discharge inpatient OT services.    Rehab identified problem list/impairments: Rehab identified problem list/impairments: impaired endurance, impaired self care skills, impaired functional " mobilty, gait instability, impaired balance, decreased lower extremity function, decreased ROM, edema, orthopedic precautions    Rehab potential is good.    Activity tolerance: Good    Discharge recommendations: Discharge Facility/Level Of Care Needs: home with home health, home health PT, home health OT     Barriers to discharge:      Equipment recommendations: walker, rolling (or wheels for standard walker)     GOALS:   Occupational Therapy Goals     Not on file      Multidisciplinary Problems (Resolved)        Problem: Occupational Therapy Goal    Goal Priority Disciplines Outcome Interventions   Occupational Therapy Goal   (Resolved)     OT, PT/OT Outcome(s) achieved              PLAN:  Discharge from inpatient OT services  Plan of Care reviewed with: patient, spouse         JAHAIRA Suh  04/25/2017

## 2017-04-25 NOTE — PLAN OF CARE
"Problem: Physical Therapy Goal  Goal: Physical Therapy Goal  Goals to be met by: 17     Patient will increase functional independence with mobility by performin. Supine to sit with modified independence.   2. Sit to supine with modified independence.   3. Sit<>stand transfer with modified independence using rolling walker.   4. Gait x 150 feet with modified independence using rolling walker.   5. Ascend/descend 1 6" curb step with RW SBA.   Outcome: Ongoing (interventions implemented as appropriate)     Progressing well towards goals       "

## 2017-04-25 NOTE — PLAN OF CARE
Problem: Patient Care Overview  Goal: Plan of Care Review  Outcome: Ongoing (interventions implemented as appropriate)  Patient on RA.  IS tolerated. 98% Sats.  Will continue to monitor.

## 2017-04-25 NOTE — PROGRESS NOTES
"Progress Note  Orthopedics    Admit Date: 4/24/2017   Patient ID: Nella Lewis is a 51 y.o. female.  POD#1 L TKR.  Doing very well, dressing dry, NV intact.  Amb 250 ft.  OK c CLIFFORD this pm.          Harish Julian      Vital Sign (recent):  BP (!) 151/89 (BP Location: Left arm, Patient Position: Lying, BP Method: Automatic)  Pulse 94  Temp 98.4 °F (36.9 °C) (Oral)   Resp 18  Ht 5' 5" (1.651 m)  Wt 80.7 kg (178 lb)  SpO2 98%  Breastfeeding? No  BMI 29.62 kg/m2      Laboratory:    CBC:   Recent Labs  Lab 04/25/17  0526   WBC 8.20   RBC 4.44   HGB 12.4   HCT 38.7      MCV 87   MCH 27.9   MCHC 32.0       CMP: No results for input(s): GLU, CALCIUM, ALBUMIN, PROT, NA, K, CO2, CL, BUN, CREATININE, ALKPHOS, ALT, AST, BILITOT in the last 168 hours.        Intake/Output Summary (Last 24 hours) at 04/25/17 0838  Last data filed at 04/25/17 0600   Gross per 24 hour   Intake          3123.75 ml   Output             4475 ml   Net         -1351.25 ml         Current Medications:   acetaminophen  1,000 mg Intravenous Q8H    aspirin  325 mg Oral Q12H    atorvastatin  20 mg Oral Daily    celecoxib  200 mg Oral BID    docusate sodium  100 mg Oral Q12H    fluticasone  1 spray Each Nare Daily    levothyroxine  25 mcg Oral Daily    lisinopril  10 mg Oral Daily    medroxyPROGESTERone 1.25 mg  1.25 mg Oral Daily    mupirocin  1 g Nasal BID    pantoprazole  40 mg Oral ED 1 Time    polyethylene glycol  17 g Oral Daily       Continuous Infusions:   dextrose 5 % and 0.9 % NaCl 75 mL/hr at 04/24/17 1421     PRN Meds:.diphenhydrAMINE, HYDROmorphone, ondansetron, oxycodone, oxycodone, promethazine (PHENERGAN) IVPB, sodium chloride 0.9%  "

## 2017-04-25 NOTE — PLAN OF CARE
Problem: Patient Care Overview  Goal: Plan of Care Review  Outcome: Ongoing (interventions implemented as appropriate)  Self-administers IS.

## 2017-04-25 NOTE — PLAN OF CARE
"Problem: Physical Therapy Goal  Goal: Physical Therapy Goal  Goals to be met by: 17     Patient will increase functional independence with mobility by performin. Supine to sit with modified independence.   2. Sit to supine with modified independence.   3. Sit<>stand transfer with modified independence using rolling walker.   4. Gait x 150 feet with modified independence using rolling walker.   5. Ascend/descend 1 6" curb step with RW SBA.   Outcome: Ongoing (interventions implemented as appropriate)  Excellent progress BID session including gait > 300 ft with rolling walker. Anticipate d/c home POD #1 from PT perspective. Will continue to follow and progress as tolerated. Please see progress note for detailed plan of care and recommendations.          "

## 2017-04-25 NOTE — PLAN OF CARE
Problem: Occupational Therapy Goal  Goal: Occupational Therapy Goal  Outcome: Outcome(s) achieved Date Met:  04/25/17  Initial OT evaluation complete.  Patient is min assist to SBA for basic ADL tasks and her  is able to assist her at this level.  Recommend  OT and PT.  No further inpatient OT services required.    Comments:   JAHAIRA Suh, 4/25/2017

## 2017-04-25 NOTE — DISCHARGE INSTRUCTIONS
Knee Athroscopy Discharge Instructions    1) Pain: After surgery your knee will be sore. The knee will likely have been injected with a numbing medicine (Exparel) prior to completion of surgery for pain control. This is indicated on a green bracelet that you will continue to wear for 4 days after surgery. You will   also get a prescription for pain control before you leave the hospital. Ice and elevation will assist with pain control.    a) Apply ice as much as possible for the first 72 hours. After 72 hours, apply ice for 20minutes after therapy,after exercising or whenever experiencing pain. Avoid direct skin contact with ice to prevent frostbit.          b) Elevate the affected leg with the pillow the length of the leg higher than your heart to assist with swelling and pain.  2) Incision Care:  a) Some drainage from the incision in the first 72 hours is normal. If drainage is excessive,remove bandage, clean with mild soap and water, pat dry, cover with sterile gauze and secure with tape. Notify physician about excessive drainage. Staples will be removed 14-21 days after surgery   3) Activity:  a) Perform exercises 2-3 x day.  b) You may shower 48 hours after surgery providing the dressing is waterproof. No tub or hot tub usage. Support help is mandatory during showering. If the dressing becomes wet, replace with a new dressing.   c) Wear thigh high yahaira hose stockings for 3 weeks after surgery .You may remove stockings for 1- 2 hours during the day only.  d) If your physician orders the CPM machine you are to use it for 2 hours in the am and 2 hours in the pm.Increase the flexion 5 degrees each session if tolerated. This is not to replace your exercise program.  4) For lifetime after your replacement surgery, you may need antibiotic coverage before dental or minor surgical procedures.  5) Possible Complication:  a) Infection: Report these signs and symptoms to your surgeon.  i) Unexpected redness around  incision   ii) Persistent drainage from wound after 72 hours.  iii) Temperature greater than 101 degrees F  iv) Additional swelling  v) Pain not controlled with current pain medication  b) Blood Clot: Report theses signs and symptoms to your surgeon  i) Unusual pain  ii) Red or discolored skin  iii) Swelling in the leg  iv) Unusual warm skin        Thank you for choosing [facility] as your Health Care Provider. [facility] strives to provide the best healthcare available to you. In the next few days you may receive a Survey, either by mail or email,  asking you to rate our care that was provided to you during your stay.  Please return the survey to us, as your feedback is important. We aim to meet your expectations of safe, quality health care.    From your [facility] Team.

## 2017-04-25 NOTE — NURSING
11:29 AM   In agreement and eager for DC.  Post operative limb neurovascularly intact.  HH and DME arranged per CMGT.  Walker has arrived to room and CPM and polar care packed for pt.  VU of DC instructions, paperwork and prescriptions passed and IV removed with cath tip intact, WNL-per Jodi RN Ortho Navigator.. All available Immunizations administered prior to DC.   DC home with family.  Has been escorted downstairs via  transport team once dressed and ready.  Free from falls or skin breakdown this hospital admission.

## 2017-04-25 NOTE — NURSING
Discharge instructions reviewed with pt and spouse at length and verbalized 100% understanding. Extra pair thigh high yahaira hose sent home with pt

## 2017-04-25 NOTE — PT/OT/SLP PROGRESS
Physical Therapy  Treatment    Nella Lewis   MRN: [id number]   Admitting Diagnosis: Pre-op Diagnosis: Primary osteoarthritis of left knee [M17.12] s/p Procedure(s):  REPLACEMENT-KNEE WITH NAVIGATION       PT Received On: 04/24/17  PT Start Time: 1440     PT Stop Time: 1504    PT Total Time (min): 24 min       Billable Minutes:  Gait Training 16 and Therapeutic Exercise 8    Treatment Type: Treatment  PT/PTA: PT     PTA Visit Number: 0       General Precautions: Standard,  (fall risk)  Orthopedic Precautions: Full weight bearing   Braces: N/A    Do you have any cultural, spiritual, Episcopal conflicts, given your current situation?: None specified    Subjective:  Communicated with nurse prior to session.  Pt eager to participate. She requested to remain up in chair at end of second PT session. (Nurse notified.)    Pain Rating: 3/10  Location - Side: Left     Location: knee  Pain Addressed: Pre-medicate for activity, Cessation of Activity  Pain Rating Post-Intervention: 3/10    Objective:    Pt found reclined in chair with spouse present.     Functional Mobility:  Bed Mobility:   Supine to Sit:  (Did not perform)    Transfers:  Sit <> Stand Assistance: Stand By Assistance (x 1 trial, verbal cues fo technique)  Sit <> Stand Assistive Device: Rolling Walker    Gait:   Gait Distance: x 340 ft on level tile, verbal cues for posture and proximity to walker  Assistance 1: Contact Guard Assistance  Gait Assistive Device: Rolling walker  Gait Pattern: reciprocal  Gait Deviation(s): decreased viri, decreased toe-to-floor clearance, decreased velocity of limb motion, decreased stride length, decreased step length        Therapeutic Activities and Exercises:  Pt performed therapeutic exercises reclined in chair including ankle pumps, quad sets, glute sets x 15 reps with verbal and tactile cues.        AM-PAC 6 CLICK MOBILITY  How much help from another person does this patient currently need?   1 = Unable,  Total/Dependent Assistance  2 = A lot, Maximum/Moderate Assistance  3 = A little, Minimum/Contact Guard/Supervision  4 = None, Modified [place]/Independent    Turning over in bed (including adjusting bedclothes, sheets and blankets)?: 4  Sitting down on and standing up from a chair with arms (e.g., wheelchair, bedside commode, etc.): 3  Moving from lying on back to sitting on the side of the bed?: 4  Moving to and from a bed to a chair (including a wheelchair)?: 3  Need to walk in hospital room?: 3  Climbing 3-5 steps with a railing?: 3  Total Score: 20    AM-PAC Raw Score CMS G-Code Modifier Level of Impairment Assistance   6 % Total / Unable   7 - 9 CM 80 - 100% Maximal Assist   10 - 14 CL 60 - 80% Moderate Assist   15 - 19 CK 40 - 60% Moderate Assist   20 - 22 CJ 20 - 40% Minimal Assist   23 CI 1-20% SBA / CGA   24 CH 0% Independent/ Mod I     Patient left reclined in chair with all lines intact, call button in reach, nurse notified and spouse present.    Assessment:  Nella Lewis is a 51 y.o. female with a medical diagnosis of Pre-op Diagnosis: Primary osteoarthritis of left knee [M17.12] s/p Procedure(s):  REPLACEMENT-KNEE WITH NAVIGATION. Excellent progress BID session including gait > 300 ft with rolling walker. Anticipate d/c home POD #1 from PT perspective.        Rehab identified problem list/impairments:  As per PT evaluation    Rehab potential is excellent.    Activity tolerance: Excellent    Discharge recommendations:   As per PT evaluation    Barriers to discharge:  None    Equipment recommendations:   As per PT evaluation    GOALS:   Physical Therapy Goals        Problem: Physical Therapy Goal    Goal Priority Disciplines Outcome Goal Variances Interventions   Physical Therapy Goal     PT/OT, PT Ongoing (interventions implemented as appropriate)     Description:  Goals to be met by: 17     Patient will increase functional independence with mobility by performin. Supine  "to sit with modified independence.   2. Sit to supine with modified independence.   3. Sit<>stand transfer with modified independence using rolling walker.   4. Gait x 150 feet with modified independence using rolling walker.   5. Ascend/descend 1 6" curb step with RW SBA.               PLAN:    Patient to be seen BID  to address the above listed problems via therapeutic activities, therapeutic exercises, gait training, neuromuscular re-education  Plan of Care expires: 05/24/17  Plan of Care reviewed with: spouse, patient         Cassandra Rupa, PT  04/24/2017    "

## 2017-04-25 NOTE — PROGRESS NOTES
"Nephrology  Progress Note    Admit Date: 4/24/2017   LOS: 1 day     SUBJECTIVE:     Follow-up For:  Primary osteoarthritis of left knee    Interval History:     Had pain control issues last night but much improved this am.  Denies CP/SOB/Calf pain.  Walked well with PT.  Discussed with Dr. Julian.     Review of Systems:  Constitutional: No fever or chills  Respiratory: No cough or shortness of breath  Cardiovascular: No chest pain or palpitations  Gastrointestinal: No nausea or vomiting  Neurological: No confusion or weakness    OBJECTIVE:     Vital Signs Range (Last 24H):  BP (!) 151/89 (BP Location: Left arm, Patient Position: Lying, BP Method: Automatic)  Pulse 94  Temp 98.4 °F (36.9 °C) (Oral)   Resp 18  Ht 5' 5" (1.651 m)  Wt 80.7 kg (178 lb)  SpO2 98%  Breastfeeding? No  BMI 29.62 kg/m2    Temp:  [97.5 °F (36.4 °C)-98.7 °F (37.1 °C)]   Pulse:  []   Resp:  [18-20]   BP: (137-173)/()   SpO2:  [96 %-100 %]     I & O (Last 24H):  Intake/Output Summary (Last 24 hours) at 04/25/17 0912  Last data filed at 04/25/17 0600   Gross per 24 hour   Intake          3123.75 ml   Output             4475 ml   Net         -1351.25 ml       Physical Exam:  General appearance: Well developed, well nourished  Eyes:  Conjunctivae/corneas clear. PERRL.  Lungs: Normal respiratory effort,   clear to auscultation bilaterally   Heart: Regular rate and rhythm, S1, S2 normal, no murmur, rub or elias.  Abdomen: Soft, non-tender non-distended; bowel sounds normal; no masses,  no organomegaly  Extremities: No cyanosis or clubbing. No edema.    Skin: Skin color, texture, turgor normal. No rashes or lesions  Neurologic: Normal strength and tone. No focal numbness or weakness   Left Knee:  CDI    Laboratory Data:    Recent Labs  Lab 04/25/17  0526   WBC 8.20   RBC 4.44   HGB 12.4   HCT 38.7      MCV 87   MCH 27.9   MCHC 32.0       BMP: No results for input(s): GLU, NA, K, CL, CO2, BUN, CREATININE, CALCIUM, MG in the " last 168 hours.    Invalid input(s):  PHOS  Lab Results   Component Value Date    CALCIUM 10.0 04/11/2017               Medications:  Medication list was reviewed and changes noted under Assessment/Plan.    Diagnostic Results:        ASSESSMENT/PLAN:     1. HTN- home meds w/ hold parameters  2. Hypothyroisim-Conitnue synthroid  3. Rheumatoid Arthritis-Has been off of plaquenil and Arthrotec x 1 week. Asymptomatic  4. Hx PUD-PPI  5. HRT- Pt on Provera which is higher risk for DVT discussed risk/ benefit with patient and she is concerned about HRT withdrawal symptoms. Dr. Clements discussed decreasing to qod and she is agreeable to this and acknowledges that it still places her at increased risk for DVT c/w someone not taking HRT.  6. DVT prophylaxis-Pt. On ASA bid  7. S/p Left TKA-per ortho  8. Pain control:  Defer to ortho.          Ok to DC from medicine.

## 2017-04-25 NOTE — PROGRESS NOTES
"Physical Therapy Discharge Summary    Nella Lewis  MRN: [id number]   Primary osteoarthritis of left knee   Patient Discharged from acute Physical Therapy on 2017.  Please refer to prior PT noted date on 2017 for functional status.     Assessment:   Patient has not met goals.  GOALS:   Physical Therapy Goals        Problem: Physical Therapy Goal    Goal Priority Disciplines Outcome Goal Variances Interventions   Physical Therapy Goal     PT/OT, PT Ongoing (interventions implemented as appropriate)     Description:  Goals to be met by: 17     Patient will increase functional independence with mobility by performin. Supine to sit with modified independence.   2. Sit to supine with modified independence.   3. Sit<>stand transfer with modified independence using rolling walker.   4. Gait x 150 feet with modified independence using rolling walker.   5. Ascend/descend 1 6" curb step with RW SBA.             Reasons for Discontinuation of Therapy Services  Transfer to alternate level of care.      Plan:  Patient Discharged to: Home with Home Health Service.  "

## 2017-04-25 NOTE — PLAN OF CARE
Pt discharged with Guero LEBRON and rolling walker.     04/25/17 0918   Final Note   Discharge Disposition Home-Health   Discharge planning education complete? Yes   What phone number can be called within the next 1-3 days to see how you are doing after discharge? ([phone])   Hospital Follow Up  Appt(s) scheduled? No   Offered [facility] -- Bedside Delivery? n/a   Discharge/Hospital Encounter Summary to (non[facility]) PCP n/a   Referral to Outpatient Case Management complete? n/a   Referral to / orders for Home Health Complete? Yes   30 day supply of medicines given at discharge, if documented non-compliance / non-adherence? n/a   Any social issues identified prior to discharge? No   Did you assess the readiness or willingness of the family or caregiver to support self management of care? n/a

## 2017-04-27 ENCOUNTER — PATIENT OUTREACH (OUTPATIENT)
Dept: ADMINISTRATIVE | Facility: CLINIC | Age: 52
End: 2017-04-27
Payer: MEDICARE

## 2017-05-02 NOTE — DISCHARGE SUMMARY
[facility]  Short Stay  Discharge Summary  TOTAL KNEE REPLACEMENT    Admit Date: 4/24/2017    Discharge Date and Time: 4/25/2017 12:00 PM      Discharge Attending Physician: Harish Julian MD    Hospital Course:  Nella Lewis,is a 51 y.o. female with severe osteoarthritis L knee, unrelieved with the conservative measures. The patient was admitted on  4/24/2017 and underwent L total knee replacement with computer-assisted navigation. Postoperatively, weightbearing as tolerated with a walker and CPM machine was initiated on postop day #1. Nella Lewis did quite well and was discharged on 4/25/2017  with home health physical therapy and nursing. The patient will be on Percocet for pain and aspirin 325 mg p.o. b.i.d. with meals x4 weeks.  A CPM machine will will be sent home with the patient. Postoperative follow up will be in the office in 4 weeks.       Final Diagnoses:    Principal Problem: Primary osteoarthritis of left knee   Secondary Diagnoses:   Active Hospital Problems    Diagnosis  POA   No active problems to display.      Resolved Hospital Problems    Diagnosis Date Resolved POA    *Primary osteoarthritis of left knee [M17.12] 04/25/2017 Yes       Discharged Condition: good    Disposition: Home-Health Care [facility] [place] [facility]    Follow up/Patient Instructions:    Medications:  Reconciled Home Medications:   Discharge Medication List as of 4/25/2017  9:16 AM      START taking these medications    Details   aspirin 325 MG tablet Take 1 tablet (325 mg total) by mouth every 12 (twelve) hours. For  4 weeks, Starting 4/25/2017, Until Thu 5/25/17, OTC      oxycodone-acetaminophen (PERCOCET) 5-325 mg per tablet 1 tab every 4 hours PRN pain or 2 tablets every 6 hours PRN pain, Print         CONTINUE these medications which have NOT CHANGED    Details   atorvastatin (LIPITOR) 20 MG tablet Take 20 mg by mouth once daily., Until Discontinued, Historical Med      fexofenadine (ALLEGRA) 60 MG tablet Take 60 mg by  "mouth once daily., Until Discontinued, Historical Med      fluticasone (FLONASE) 50 mcg/actuation nasal spray 1 spray by Each Nare route once daily., Until Discontinued, Historical Med      levothyroxine (SYNTHROID) 25 MCG tablet Take 25 mcg by mouth once daily., Until Discontinued, Historical Med      lisinopril 10 MG tablet Take 10 mg by mouth once daily., Until Discontinued, Historical Med      medroxyPROGESTERone (PROVERA) 2.5 MG tablet Take 1.25 mg by mouth once daily., Until Discontinued, Historical Med      pantoprazole (PROTONIX) 20 MG tablet Take 20 mg by mouth once daily., Until Discontinued, Historical Med      terbinafine HCl (LAMISIL) 1 % cream Apply topically 2 (two) times daily., Until Discontinued, Historical Med         STOP taking these medications       acetaminophen (TYLENOL 8 HOUR) 650 MG TbSR Comments:   Reason for Stopping:         BIFIDOBACTERIUM INFANTIS (ALIGN ORAL) Comments:   Reason for Stopping:         calcium carbonate-vitamin D3 (CALCIUM 500 + D) 500 mg(1,250mg) -400 unit Tab Comments:   Reason for Stopping:         diclofenac-misoprostol  mg-mcg (ARTHROTEC 75)  mg-mcg per tablet Comments:   Reason for Stopping:         fish oil-omega-3 fatty acids 300-1,000 mg capsule Comments:   Reason for Stopping:         hydroxychloroquine (PLAQUENIL) 200 mg tablet Comments:   Reason for Stopping:         mupirocin (BACTROBAN) 2 % ointment Comments:   Reason for Stopping:         MV,FE,MIN/LUTEIN (CENTRUM SILVER ULTRA WOMEN'S ORAL) Comments:   Reason for Stopping:         vitamin E 400 UNIT capsule Comments:   Reason for Stopping:               Discharge Procedure Orders  WALKER FOR HOME USE   Order Specific Question Answer Comments   Type of Walker: Adult (5'4"-6'6")    With wheels? Yes    Height: 5' 5" (1.651 m)    Weight: 80.3 kg (177 lb)    Length of need (1-99 months): 99    Does patient have medical equipment at home? walker, standard    Please check all that apply: Patient's " "condition impairs ambulation.      WALKER FOR HOME USE   Order Specific Question Answer Comments   Type of Walker: Adult (5'4"-6'6")    With wheels? Yes    Height: 5' 5" (1.651 m)    Weight: 80.7 kg (178 lb)    Does patient have medical equipment at home? rollator    Length of need (1-99 months): 99      3 IN 1 COMMODE FOR HOME USE   Order Specific Question Answer Comments   Type: Standard    Height: 5' 5" (1.651 m)    Weight: 80.7 kg (178 lb)    Does patient have medical equipment at home? rollator    Length of need (1-99 months): 99      CANE FOR HOME USE   Order Specific Question Answer Comments   Type of Cane: Straight    Height: 5' 5" (1.651 m)    Weight: 80.7 kg (178 lb)    Does patient have medical equipment at home? rollator    Length of need (1-99 months): 99      Diet general     Weight bearing restrictions (specify)       Follow-up Information     Follow up with Harish Julian MD In 4 weeks.    Specialty:  Orthopedic Surgery    Contact information:    [address]  [facility]  [facility] [address]  [phone]          Follow up with [facility].    Specialties:  DME Provider, Home Health Services    Why:  Home Health    Contact information:    [address] [place] [id number]  [phone]          Follow up with [facility]    Specialty:  DME Provider    Why:  Rolling Walker    Contact information:    [address]  [facility] [address]  [phone]              "

## 2023-04-26 ENCOUNTER — OFFICE VISIT (OUTPATIENT)
Dept: OPTOMETRY | Facility: CLINIC | Age: 58
End: 2023-04-26
Payer: MEDICARE

## 2023-04-26 DIAGNOSIS — H52.4 MYOPIA WITH ASTIGMATISM AND PRESBYOPIA, BILATERAL: ICD-10-CM

## 2023-04-26 DIAGNOSIS — H16.223 KERATOCONJUNCTIVITIS SICCA NOT SPECIFIED AS SJOGREN'S, BILATERAL: Primary | ICD-10-CM

## 2023-04-26 DIAGNOSIS — H52.13 MYOPIA WITH ASTIGMATISM AND PRESBYOPIA, BILATERAL: ICD-10-CM

## 2023-04-26 DIAGNOSIS — Z79.899 LONG-TERM USE OF PLAQUENIL: ICD-10-CM

## 2023-04-26 DIAGNOSIS — H52.203 MYOPIA WITH ASTIGMATISM AND PRESBYOPIA, BILATERAL: ICD-10-CM

## 2023-04-26 DIAGNOSIS — M06.9 RHEUMATOID ARTHRITIS, INVOLVING UNSPECIFIED SITE, UNSPECIFIED WHETHER RHEUMATOID FACTOR PRESENT: ICD-10-CM

## 2023-04-26 DIAGNOSIS — Z98.890 HISTORY OF LASER REFRACTIVE SURGERY: ICD-10-CM

## 2023-04-26 DIAGNOSIS — H25.13 AGE-RELATED NUCLEAR CATARACT, BILATERAL: ICD-10-CM

## 2023-04-26 PROCEDURE — 4010F PR ACE/ARB THEARPY RXD/TAKEN: ICD-10-PCS | Mod: CPTII,S$GLB,,

## 2023-04-26 PROCEDURE — 1159F MED LIST DOCD IN RCRD: CPT | Mod: CPTII,S$GLB,,

## 2023-04-26 PROCEDURE — 92015 DETERMINE REFRACTIVE STATE: CPT | Mod: S$GLB,,,

## 2023-04-26 PROCEDURE — 92015 PR REFRACTION: ICD-10-PCS | Mod: S$GLB,,,

## 2023-04-26 PROCEDURE — 92004 PR EYE EXAM, NEW PATIENT,COMPREHESV: ICD-10-PCS | Mod: S$GLB,,,

## 2023-04-26 PROCEDURE — 4010F ACE/ARB THERAPY RXD/TAKEN: CPT | Mod: CPTII,S$GLB,,

## 2023-04-26 PROCEDURE — 99999 PR PBB SHADOW E&M-NEW PATIENT-LVL III: CPT | Mod: PBBFAC,,,

## 2023-04-26 PROCEDURE — 99999 PR PBB SHADOW E&M-NEW PATIENT-LVL III: ICD-10-PCS | Mod: PBBFAC,,,

## 2023-04-26 PROCEDURE — 1159F PR MEDICATION LIST DOCUMENTED IN MEDICAL RECORD: ICD-10-PCS | Mod: CPTII,S$GLB,,

## 2023-04-26 PROCEDURE — 92004 COMPRE OPH EXAM NEW PT 1/>: CPT | Mod: S$GLB,,,

## 2023-04-26 RX ORDER — BACLOFEN 10 MG/1
10 TABLET ORAL 2 TIMES DAILY
COMMUNITY
Start: 2023-04-12

## 2023-04-26 RX ORDER — HYDROXYZINE PAMOATE 25 MG/1
25 CAPSULE ORAL 3 TIMES DAILY PRN
COMMUNITY

## 2023-04-26 RX ORDER — OMEGA-3 FATTY ACIDS 1000 MG
2 CAPSULE ORAL
COMMUNITY

## 2023-04-26 RX ORDER — VITAMIN E MIXED 400 UNIT
400 CAPSULE ORAL
COMMUNITY

## 2023-04-26 RX ORDER — HYDROCODONE BITARTRATE AND ACETAMINOPHEN 10; 325 MG/1; MG/1
1 TABLET ORAL EVERY 6 HOURS PRN
COMMUNITY

## 2023-04-26 RX ORDER — HYDROXYCHLOROQUINE SULFATE 200 MG/1
2 TABLET, FILM COATED ORAL DAILY
COMMUNITY
Start: 2022-07-21

## 2023-04-26 RX ORDER — ASCORBIC ACID 1000 MG
175 TABLET ORAL
COMMUNITY

## 2023-04-26 RX ORDER — HYDROCHLOROTHIAZIDE 25 MG/1
25 TABLET ORAL
COMMUNITY
Start: 2023-04-12

## 2023-04-26 RX ORDER — IBUPROFEN 100 MG/5ML
1000 SUSPENSION, ORAL (FINAL DOSE FORM) ORAL
COMMUNITY

## 2023-04-26 RX ORDER — SERTRALINE HYDROCHLORIDE 50 MG/1
50 TABLET, FILM COATED ORAL
COMMUNITY
Start: 2023-03-01

## 2023-04-26 RX ORDER — CICLOPIROX 80 MG/ML
SOLUTION TOPICAL
COMMUNITY
Start: 2022-11-09

## 2023-04-26 RX ORDER — ALBUTEROL SULFATE 90 UG/1
AEROSOL, METERED RESPIRATORY (INHALATION)
COMMUNITY
Start: 2023-04-01

## 2023-04-26 RX ORDER — ALUMINUM ZIRCONIUM OCTACHLOROHYDREX GLY 16 G/100G
1 GEL TOPICAL DAILY
COMMUNITY

## 2023-04-26 NOTE — PROGRESS NOTES
HPI    New pt here for eye exam. Last exam- 3 years  H/o lasik OU 1998    Pt sts she is having trouble with eyes and was told at last exam she has   cataracts. Pt denies use of glasses. Pt has FBS occasionally.  Pt denies   flashes/floaters/pain. Pt using AT PRN.   Last edited by Claritza West on 4/26/2023 10:45 AM.            Assessment /Plan     For exam results, see Encounter Report.    Keratoconjunctivitis sicca not specified as Sjogren's, bilateral    History of laser refractive surgery    Rheumatoid arthritis, involving unspecified site, unspecified whether rheumatoid factor present    Long-term use of Plaquenil  -     Kwon Visual Field - OU - Extended - Both Eyes; Future; Expected date: 05/26/2023    Age-related nuclear cataract, bilateral    Myopia with astigmatism and presbyopia, bilateral      1-2. History of LASIK OU. Longstanding dry eye signs and symptoms. Ed pt on findings and on dry eye as a side effect of LASIK. Trace SPK OD and 2+ confluent SPK patches OS. Advised pt to increase use of Systane to QID (advised to switch to PF if using more than QID) and continue with Genteal gel drop at bedtime. Allergy eye drop prn for relief of itching. Warm compresses for increased comfort. Ed pt to call or message if no improvement or worsening of symptoms, can consider short term steroid. Discussed potential need for long-term treatment with Restasis or Xiidra.    3-4. Pt has been taking Plaquenil x 10 years. Ed pt on ocular side effects associated with long-term use of Plaquenil. No signs of bull's eye maculopathy on fundus examination. Pt to return for Mac OCT and baseline 10-2 HVF.    -VF reliable with no signs of defects consistent with bull's eye maculopathy   -Pt too tired after VF to sit for OCT Mac. Given good field and no signs of bull's eye on fundus exam, obtain next year.    5. Mild, not yet visually significant. Surgery not recommended at this time. Ed pt on symptoms of worsening cataracts  including reduced BCVA and glare. Monitor yearly for changes.    6. Ed pt that while OS sees very well in the distance, OD is blurry but correctable to 20/20. Discussed spectacle options with pt and wrote rx for both PAL and . Discussed adaptation process to PAL thoroughly with pt and advised pt to allow for 2 weeks to fully adjust. Pt knows to call or message if difficulties adjusting.    RTC: 1 year for comprehensive exam or sooner prn

## 2023-05-12 ENCOUNTER — CLINICAL SUPPORT (OUTPATIENT)
Dept: OPHTHALMOLOGY | Facility: CLINIC | Age: 58
End: 2023-05-12
Payer: MEDICARE

## 2023-05-12 DIAGNOSIS — Z79.899 LONG-TERM USE OF PLAQUENIL: ICD-10-CM

## 2023-05-12 NOTE — PROGRESS NOTES
Pt completed HVF sf 10-2. Pt stated that her eyes were tired and that she would have to come back for OCT MAC. Pt sat well for test and denies adhesive allergy. Non adhesive eye patch used. Pt will f/u with Dr. Castelan for results.

## 2025-04-16 DIAGNOSIS — R09.89 PULMONARY CONGESTION: Primary | ICD-10-CM

## (undated) DEVICE — COVER BACK TABLE 72X21

## (undated) DEVICE — SOL NACL 0.9% INJ 500ML BG

## (undated) DEVICE — UNDERGLOVES BIOGEL PI SIZE 8.5

## (undated) DEVICE — SEE MEDLINE ITEM 152622

## (undated) DEVICE — DRAPE STERI INSTRUMENT 1018

## (undated) DEVICE — SOL 9P NACL IRR PIC IL

## (undated) DEVICE — TOURNIQUET SB QC SP 34X4IN

## (undated) DEVICE — ELECTRODE REM PLYHSV RETURN 9

## (undated) DEVICE — SEE MEDLINE ITEM 152523

## (undated) DEVICE — SUT VICRYL PLUS 2-0 CT1 18

## (undated) DEVICE — SUT VICRYL+ 1 CTX 18IN VIOL

## (undated) DEVICE — SEE MEDLINE ITEM 146298

## (undated) DEVICE — KIT IASSIST KNEE 2-POD

## (undated) DEVICE — KIT TOTAL HIP OPTIVAC

## (undated) DEVICE — KIT IRR SUCTION HND PIECE

## (undated) DEVICE — SEE MEDLINE ITEM 153151

## (undated) DEVICE — STAPLER SKIN PROXIMATE WIDE

## (undated) DEVICE — UNDERGLOVE BIOGEL PI SZ 6.5 LF

## (undated) DEVICE — BLADE REAMER PILOT HOLE 35MM

## (undated) DEVICE — HOOD T-5 TEAR AWAY STERILE

## (undated) DEVICE — SUT STRATAFIX PDS 1 CTX 18IN

## (undated) DEVICE — SYRINGE 30CC LL W/O NDL

## (undated) DEVICE — APPLICATOR CHLORAPREP ORN 26ML

## (undated) DEVICE — SOL IRR NACL .9% 3000ML

## (undated) DEVICE — UNDERGLOVES BIOGEL PI SZ 7 LF

## (undated) DEVICE — PRESSURIZER BN CEMENT NOZZLE

## (undated) DEVICE — DRESSING XEROFORM FOIL PK 1X8

## (undated) DEVICE — SEE MEDLINE ITEM 152530

## (undated) DEVICE — SEALER BIPOLAR TISSUE 6.0

## (undated) DEVICE — PAD COLD THERAPY KNEE WRAP ON

## (undated) DEVICE — GLOVE BIOGEL SKINSENSE PI 8.5

## (undated) DEVICE — SEE MEDLINE ITEM 146231

## (undated) DEVICE — TRAY FOLEY 16FR INFECTION CONT

## (undated) DEVICE — GLOVE BIOGEL SKINSENSE PI 7.0

## (undated) DEVICE — BLADE RECIP DOUBLE SIDED

## (undated) DEVICE — PAD CAST SPECIALIST STRL 6

## (undated) DEVICE — NDL HYPO SAFETY 22 X 1 1/2

## (undated) DEVICE — Device

## (undated) DEVICE — GLOVE BIOGEL SKINSENSE PI 6.5